# Patient Record
Sex: MALE | Race: WHITE | Employment: FULL TIME | URBAN - METROPOLITAN AREA
[De-identification: names, ages, dates, MRNs, and addresses within clinical notes are randomized per-mention and may not be internally consistent; named-entity substitution may affect disease eponyms.]

---

## 2019-04-08 ENCOUNTER — RECORDS - HEALTHEAST (OUTPATIENT)
Dept: LAB | Facility: CLINIC | Age: 39
End: 2019-04-08

## 2019-04-08 LAB
BASOPHILS # BLD AUTO: 0 THOU/UL (ref 0–0.2)
BASOPHILS NFR BLD AUTO: 1 % (ref 0–2)
EOSINOPHIL # BLD AUTO: 0.2 THOU/UL (ref 0–0.4)
EOSINOPHIL NFR BLD AUTO: 2 % (ref 0–6)
ERYTHROCYTE [DISTWIDTH] IN BLOOD BY AUTOMATED COUNT: 12.1 % (ref 11–14.5)
HCT VFR BLD AUTO: 49.6 % (ref 40–54)
HGB BLD-MCNC: 17.1 G/DL (ref 14–18)
LYMPHOCYTES # BLD AUTO: 2.1 THOU/UL (ref 0.8–4.4)
LYMPHOCYTES NFR BLD AUTO: 34 % (ref 20–40)
MCH RBC QN AUTO: 31.4 PG (ref 27–34)
MCHC RBC AUTO-ENTMCNC: 34.5 G/DL (ref 32–36)
MCV RBC AUTO: 91 FL (ref 80–100)
MONOCYTES # BLD AUTO: 0.5 THOU/UL (ref 0–0.9)
MONOCYTES NFR BLD AUTO: 7 % (ref 2–10)
NEUTROPHILS # BLD AUTO: 3.5 THOU/UL (ref 2–7.7)
NEUTROPHILS NFR BLD AUTO: 56 % (ref 50–70)
PLATELET # BLD AUTO: 259 THOU/UL (ref 140–440)
PMV BLD AUTO: 10.8 FL (ref 8.5–12.5)
RBC # BLD AUTO: 5.44 MILL/UL (ref 4.4–6.2)
WBC: 6.3 THOU/UL (ref 4–11)

## 2021-06-18 ENCOUNTER — TELEPHONE (OUTPATIENT)
Dept: PSYCHIATRY | Facility: CLINIC | Age: 41
End: 2021-06-18

## 2021-06-18 NOTE — TELEPHONE ENCOUNTER
"PSYCHIATRY CLINIC PHONE INTAKE     SERVICES REQUESTED / INTERESTED IN          Med Management Rapid Access per Tony    Presenting Problem and Brief History                              What would you like to be seen for? (brief description):    Pt referred to and ok'd by Miguel A  Pt reports struggling with anxiety for \"years,\" has tried some different meds, nothing really helps, it's gotten a lot worse over the last three years. Mild agorophobia, panic attacks/anxiety episodes. Reports losing interest in a lot things, focusing. Pt wonders about ADHD. Obtrusive thoughts.  Physical ailments - stomach problems, diverticulitis, lower stomach pain, been seeing GI specialists for the last couple years trying to figure it out    Have you received a mental health diagnosis? Yes   Which one (s): anxiety, mildly agorophobia  Is there any history of developmental delay?  No   Are you currently seeing a mental health provider?  No            Who / month last seen:    Do you have mental health records elsewhere?  Yes [facility], also a counselor for a couple months (david holguin?)  Will you sign a release so we can obtain them?  Yes    Have you ever been hospitalized for psychiatric reasons?  No  Describe:  na    Do you have current thoughts of self-harm?  No    Do you currently have thoughts of harming others?  No       Substance Use History     Do you have any history of alcohol / illicit drug use?  Yes  Describe:  Alcohol use  Have you ever received treatment for this?  No    Describe:  na     Social History     Who is the patient's a guardian?  self    Name / number: self  Have you had an ACT team in last 12 months?  No  Describe: na   Do you have any current or past legal issues?  No  Describe: na   OK to leave a detailed voicemail?  Yes    Medical/ Surgical History                                 There is no problem list on file for this patient.         Medications             No current " outpatient medications on file.         DISPOSITION      Phone screen completed with pt and routed to Sarah Rivera for Rapid Access wait list       Mariel Gutierrez

## 2021-10-04 ENCOUNTER — VIRTUAL VISIT (OUTPATIENT)
Dept: PSYCHIATRY | Facility: CLINIC | Age: 41
End: 2021-10-04
Attending: SOCIAL WORKER
Payer: COMMERCIAL

## 2021-10-04 DIAGNOSIS — F33.1 MAJOR DEPRESSIVE DISORDER, RECURRENT EPISODE, MODERATE (H): ICD-10-CM

## 2021-10-04 DIAGNOSIS — F41.1 GENERALIZED ANXIETY DISORDER: Primary | ICD-10-CM

## 2021-10-04 PROCEDURE — 90791 PSYCH DIAGNOSTIC EVALUATION: CPT | Mod: GT | Performed by: SOCIAL WORKER

## 2021-10-04 NOTE — Clinical Note
Ayo Eller,    You see Augusto next Wednesday as part of GET. Here's the start of his DA. Anxiety and depression, has increased significantly in past several years. Anxiety leads to irritability, to point that he has punched walls/hurt hand. Also has chronic stomach pain. Hopefully we can talk more in RABT on Monday.    Jennifer

## 2021-10-04 NOTE — PROGRESS NOTES
"VIDEO VISIT  Augusto Lea is a 41 year old patient who is being evaluated via a billable video visit.      The patient has been notified of following:   \"We have found that certain health care needs can be provided without the need for an in-person physical exam. This service lets us provide the care you need with a video conversation. If a prescription is necessary we can send it directly to your pharmacy. If lab work is needed we can place an order for that and you can then stop by our lab to have the test done at a later time. Insurers are generally covering virtual visits as they would in-office visits so billing should not be different than normal.  If for some reason you do get billed incorrectly, you should contact the billing office to correct it and that number is in the AVS .    Patient has given verbal consent for video visit?: Yes   How would you like to obtain your AVS?: N/A  AVS SmartPhrase [PsychAVS] has been placed in 'Patient Instructions': N/A      Video- Visit Details  Type of service:  video visit for diagnostic assessment  Time of service:    Date:  10/04/2021     Video Start Time:  2:02 pm        Video End Time:  3:44 pm    Reason for video visit:  Patient unable to travel due to Covid-19  Originating Site (patient location):  [facility]   Location- Patient's home  Distant Site (provider location):  [facility]  Mode of Communication:  Video Conference via [employer]  Consent:  Patient has given verbal consent for video visit?: Yes       ----------------------------------------------------------------------------------------------------------  [facility] [place], [place]   Psychiatry Clinic Diagnostic Assessment  Rapid Access Brief Treatment [RABT]                      Date: Oct 4, 2021    Duration: 2:02 pm to 3:44 pm (102 minutes)    Augusto Lea is a 41 year old male who prefers the name Augusto and pronoun he, him, his.    PCP: No Ref-Primary, " "Physician  Other Providers: None  Referred by self for evaluation of anxiety and questions about ADHD.       History was provided by patient who was a good historian, as well as chart review. Limits of confidentiality and purpose of the session (diagnostic evaluation) were described at the beginning of the session. An overview of the Rapid Access Brief Treatment program was provided, including that this is short-term treatment program and that recommendations for longer-term care will be provided if needed.     Chief Complaint                                                                                                           Patient is seeking evaluation and recommendations for anxiety and depression symptoms.     MENTAL HEALTH HISTORY AND DIAGNOSTIC INTERVIEW                                                                                   Augusto HERNANDEZ Lindseyyuniel completed the [employer] Neuropsychiatric Interview to aid in diagnostic assessment of current psychological functioning.    DEPRESSION: Patient reports symptoms for the past 2 years, including feeling down, depressed, and hopeless, amotivation, anhedonia (described it as a \"constant state of boredom\"), insomnia, fatigue, difficulty concentrating/making decisions, psychomotor agitation, and intermittent decreased appetite. He states he feels \"disappointed in myself but not worthless.\"    PHQ-9: 19, indicting moderately severe depression    SUICIDALITY: -intrusive thoughts with plan, without intent. Rates the likeliness of suicide in the next 3 months as a 1/100, because \"I don't like to rate anything as a 0 or 100.\"    SIB: Patient reports self-injurious behaviors of punching walls about 4x per year, which he attributes to his irritability and anxiety. He reports subsequently breaking his wrist and harming his hands as a result of this behavior. Denies other SIB.    MANIC & HYPOMANIC EPISODES: Patient reports persistent irritability (which he " "associates with anxiety). He denies racing thoughts, feeling full of energy, recklessness, and needing less sleep.     PANIC DISORDER: Patient reports having a handful of panic attacks per year. He describes symptoms of a skipping/racing heart, sweaty hands, hot flushes, trembling/shaking, shortness of breath, choking sensation/lump in throat, chest pain/pressure, fear of losing control, fear of dying. Denies dizziness/lightheadedness and tingling/numbness, dissociation, and fear of having another panic attack.    AGORAPHOBIA: Patient reports anxiety of leaving the house/being away from home for \"too long.\" He states these situations do not always bring on anxiety, and he does not always need a  to face them. Does not meet criteria for diagnosis.    SOCIAL ANXIETY DISORDER: Patient reports persistent fear and significant anxiety of being watched/judged while walking down the street and in public that has improved over the last couple years but is still present. He also endorses intermittent fears of dating, public speaking, and eating in front of others that impact his ability to function socially. He denies difficulty initiating/making a conversation, participating in small groups, and speaking to authority figures.    OBSESSIVE-COMPULSIVE DISORDER: Patient reports intrusive thoughts of death, dying, and \"impending doom\" such as someone breaking into his house. He reports compulsive behaviors such as double checking locks about 2x, counting stairs, and being obsessed with even numbers. He reports he does not feel like the compulsions take up too much of his time, but the intrusive thoughts interfere with his social life and ability to concentrate. Likely does not meet full criteria for diagnosis.    POST-TRAUMATIC STRESS DISORDER: Patient does not endorse history of trauma.    ALCOHOL USE DISORDER: Patient reports drinking about 1.75 mL of vodka per week. He prefers [place] and does not like vodka as much, " "but he switched to vodka to try to help his chronic abdominal pain. He states he does not drink everyday and \"tries to not drink during the week.\" Patient reports drinking alcohol helps with his anxiety, but not his depression. Patient reports sometimes drinking more than he planned, intermittently craving alcohol, and has attempted to decrease the amount he drinks with no success. He notes he received a DUI about 10 years ago but has not driven while drinking since then or engaged in other risky behaviors when drinking.    SUBSTANCE USE DISORDER: Patient reports recently trying marijuana edibles in an effort to decrease anxiety, but he reports this did not help. Does not endorse any other substance use.    EATING DISORDER: Patient does not endorse eating disorder symptoms.     GENERALIZED ANXIETY DISORDER: Patient reports restlessness/feeling on edge, muscle tension, difficulty controlling worries, difficulty sleeping, difficulty concentrating, feeling tired, and irritability that worsens with chronic pain flare-ups.     JE score today: 19, indicating severe anxiety    PSYCHOSIS: Patient does not endorse psychosis symptoms.    ADHD: Patient reports difficulty concentrating, inattention, easily distracted since childhood, ongoing difficulty starting/completing tasks, lack of motivation. He reports he is \"messy, but it works for me.\"      Substance Use History                                                                 Past Use- Alcohol- yes  and Cannabis- recently tried cannabis 1x for anxiety management with no relief   Treatment [#, most recent]- None    Medical Consequences [withdrawal, sz etc]- SIB- None   Consequences- SIB- Patient reports he received a DUI about 10 years ago, but he has not driven after drinking since and denies current risky behavior while drinking. Alcohol may negatively impact stomach pain.    CAGE: Patient answered \"yes\" to feeling like he should Cut down on his drinking and feeling " "Guilty about his drinking. Score of 1 would indicate potential need for further assessment.     Patient appears in Contemplation Stage of Change. He expressed some concern about level of drinking. He tries not to drink during the week but has not yet explored additional strategies/support in changing alcohol use.    Psychiatric History     Suicidal ideation- Yes, patient describes SI as intrusive thoughts that he \"wouldn't act on because I'm scared to die.\" Thoughts include using gun, subside on their own his intrusive thoughts. He has access to hunting guns at home and wants to have a handgun for protection, but does not trust self due to his intrusive thoughts. Protective factor of being scared to die.  Suicide Attempt:   #- 0     SIB- Patient reports punching walls about 3-4 times per year that he associates with his irritability and anxiety.   Marcella- None    Psychosis- None    Psych Hosp- None   Outpatient Programs [ DBT, Day Treatment, Eating Disorder Tx etc]- None, client reports trying therapy in the past  PAST MED TRIALS: Lexapro, but he \"did not feel like it was doing enough.\" He also reports having tried Paxil, but this \"gave me every side effect, including intrusive thoughts.\" Alprazolam    WHODAS: 36      Patient report of symptoms:   Depression-  Patient reports symptoms for the past 2 years of feeling down, depressed, and hopeless, as well as lack of motivation, anhedonia (described it as a \"constant state of boredom\"), insomnia, fatigue, difficulty concentrating/making decisions, fidgety/restlessness, and intermittent decreased appetite. He used to have many hobbies including hunting, carpentry, golfing, and playing video games, but \"none of it brings me pleasure anymore.\" He states that \"I don't remember what happy is, content is the best I can get to.\" He reports being a  2+ years ago, which was the last memory he has of being \"happy.\"  Anxiety- Patient reports experiencing \"test anxiety\" " "as a child, but his anxiety notably worsened about 3-4 years ago. He states he does not \"do the things I used to do, sometimes I can't do anything.\" He notes he takes approximately 1-2 mental health days per month, and received reasonable accommodation to work from home due to his anxiety. He also endorses insomnia, racing thoughts, fidgeting/pacing/restlessness, and difficulty concentrating.  OCD- Patient reports intrusive thoughts that began about 10-12 years ago of death, dying, and \"impending doom\" such as someone breaking into his house. He reports he intermittently tries to suppress these thoughts, particularly at bedtime. He reports compulsive behaviors such as double checking locks about 2x, counting stairs, and being obsessed with even numbers. He reports he does not feel like the compulsions take up too much of his time, but the intrusive thoughts interfere with his social life and ability to concentrate. He states he has been prescribed medication to manage his intrusive thoughts, but has had no relief.    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 He endorses negative body image and reports that he \"used to be 240 pounds of muscle, but this kind of turned into 240 pounds of fat since I stopped working out.\"     Social/ Family History                    FINANCIAL SUPPORT- Patient reports working as a  for the [employer] for about 10 years. He currently works from home after receiving reasonable accommodation due to his anxiety. He would like more challenges in his work.  CHILDREN- None       LIVING SITUATION- Currently living alone. He reports moving here from [place], [place] for work, but he states he does not like [place]. \"If it were up to me, I would keep my job but move back to [place],\" but reports this is not " "possible.  LEGAL- DUI about 10 years ago. Does not endorse other legal concerns.  EARLY HISTORY/ EDUCATION- Patient is not aware of any developmental barriers as a child. Patient does not endorse any educational barriers, reports having a Bachelor's degree in criminal justice.  SOCIAL/ SPIRITUAL SUPPORT- Patient reports he has close relationships with his mother, father, 3 sisters, nieces, and a nephew. He also reports having a couple friends in [place], but his closest friends live in his hometown of [place], [place]. He states he has not seen these friends for about 2 years due to COVID-19 and living far away, which is difficult for him and creates feelings of loneliness. He reports he avoids romantic relationships (has not had one for 4 years) because he \"feels like a burden\" and \"meeting someone seems terrifying.\" Patient reports growing up Mandaeism and going to [employer], but now he reports \"not having much spirituality\" or [employer] affiliation.  CULTURAL INFLUENCES/ IMPACT- Patient reports identifying as male, he states \"ethnicity is not important to me.\" He describes himself as a \"country, small town person\" and [place], [place] was the \"perfect sized town\" for him.       TRAUMA HISTORY (self-report)- None  FEELS SAFE AT HOME- Yes  FAMILY HISTORY-  Patient states he is unaware of mental health family history, his father drank alcohol when patient was a child but his father no longer drinks alcohol. 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  MEDICAL: Chronic low abdomen pain for the past 3-4 years, which worsens anxiety and depression symptoms. He states he has been seeing doctors for this chronic pain and they have ruled out cancer.    Current Medications                                                                                           "               No current outpatient medications on file.       Mental Status Exam                                                                                       Alertness: alert  and oriented  Appearance: adequately groomed  Behavior/Demeanor: cooperative, with good  eye contact   Speech: regular rate and rhythm  Language: good  Psychomotor: normal or unremarkable  Mood: anxious  Affect: appropriate; was congruent to mood; was congruent to content  Thought Process/Associations: unremarkable  Thought Content:  Reports none;  Denies suicidal ideation, although patient reports recurrent intrusive thoughts of dying and harming self with a gun, no intent.  Perception:  Reports none;  Denies none  Insight: good  Judgment: fair  Cognition: (6) does  appear grossly intact; formal cognitive testing was not done  Gait and Station: Unable to assess due to video visit    DSM-5 DIAGNOSES     F41.1 Generalized Anxiety Disorder  F33.1 Major Depressive, Recurrent, Moderate  -Rule out ADHD, Social Anxiety Disorder    Augusto Lea is a 41 year old year old  single White male who presents to clinic for assessment and treatment of anxiety and depression symptoms. He reported some anxiety related to test taking as a child. Current symptoms of anxiety started approximately 3-4 years ago, with depressive symptoms following. Current anxiety symptoms include periods of panic, racing thoughts, difficulty with concentration, insomnia, intrusive thoughts (sense of doom), and irritability. Current symptoms of depression include anhedonia, amotivation, feeling down, and suicidal ideation. Based upon patient report, [facility] assessment, and review of records, diagnoses of Generalized Anxiety Disorder and Major Depressive Disorder, Recurrent, moderate were given. Although patient seeking support to manage symptoms anxiety and these appear most prevalent, it appears that depressive symptoms are playing an increasingly significant role in  patient's functioning. Differential diagnoses or rule outs of ADHD and Social Anxiety Disorder were also explored. Some of the symptoms identified as potential ADHD may be explained by current anxiety or depressive symptoms (ie feeling fidgety, difficulty concentrating), but will continue to be explored. As Augusto identifies a range of anxiety-provoking situations, JE may better describe range of symptoms.  There are medical comorbidities which impact this treatment, including chronic stomach pain.     Current symptoms represent a change in functioning in the following areas: home-chores, work, social-strangers and social-friends. Identified psychosocial factors that could potentially interact with symptoms and functioning include Medical Comorbidites. More specifically, Augusto identifies periods of being unable to work several times each month and has been approved for accommodations at work, including working from home. He reports limiting social activities due to anxiety and depression symptoms and is having difficulty completing tasks around his home. He has also reported punching walls and hurting his hand due to irritability and anxiety. Of note, patient reports consuming a significant amount of alcohol each week. It is recommended that he find ways to decrease substance use. Writer able to provide community resources if requested.    Augusto Lea also exhibited or shared many strengths during the assessment, including strong work history, family and friend support, a history of many interests and hobbies, and seeking out professional supports. He self-identified strengths of intelligence honesty/authenticity. Strengths in these areas may assist recovery and decrease functional impairments by providing on-going stability, decreasing impact of possible social determinants of health, and providing structure to non-working time when he begins to experience relief from symptoms.    Augusto is not currently engaged  in services in the community; this is not a recommended need at this time.    Writer is making the following recommendations:  -see Dr. Grady in 1 week for med eval  -psychotherapy, potentially cognitive behavioral therapy for anxious and depressed thoughts, supportive to explore future goals/plans and how these may relate to anxious state. Without further treatment, prognosis for patient is fair. It is expected that Augusto will benefit from a combination of medication management and individual psychotherapy interventions.      Plan                                                                                                                      The results of this diagnostic assessment and psychological testing will be discussed at the next RABT team meeting to determine appropriateness for RABT program and treatment recommendations.     Augusto Lea will return in 1 week for a medical evaluation and to discuss feedback from the team and treatment recommendations.        RTC: 1 week for medical evaluation and feedback from treatment team.    CRISIS NUMBERS:   Provided routinely in AVS.    Treatment Risk Statement:  The patient understands the risks, benefits and alternatives. Agrees to treatment with the capacity to do so and agrees to call clinic for any problems. The patient understands to call 911 or go to the nearest ED if life threatening or urgent symptoms occur.     PROVIDER:  Rose Marie Enriquez, KRISHNA, LICSW

## 2021-10-10 ENCOUNTER — HEALTH MAINTENANCE LETTER (OUTPATIENT)
Age: 41
End: 2021-10-10

## 2021-10-11 NOTE — PROGRESS NOTES
Telehealth Details  Type of service:  video visit for consult  Date of service: Oct 13, 2021  Time of service:    Start Time:  1:43 PM    End Time:  3:22 PM    Reason for video visit:  Services only offered telehealth  Originating Site (patient location):  Patient's home  Distant Site (provider location):  [facility]  Mode of Communication:  Video conference via Nanosphere       [facility]  Rapid Access Brief Treatment Program  MEDICATION MANAGEMENT CONSULTATION     Augusto Lea is a 41 year old with history of anxiety.     CARE TEAM: PCP- Sonia Kennedy, Therapist- None     Chief Complaint     Anxiety and depression have been really difficult lately.      Assessment & Plan    History and interview support the following diagnoses:   Major depressive disorder, recurrent, severe  Generalized anxiety disorder  Alcohol dependence, moderate  Insomnia secondary to mood disorder    Augusto reports long history of anxiety that has worsened over time, without obvious contributors, and especially has worsened during the pandemic. Depression is a newer issue, having started more gradually over the last few years, and is closely associated with the anxiety. Also notes a pattern of finding alcohol more helpful than he would prefer in helping to reduce anxiety and insomnia. Actively tries to limit use, but does tend to rely on it when things get more severe.   Psychotherapy discussion: Primary recommendation for the treatment of mood symptoms, especially anxiety. Supportive therapy can be useful for reducing the impact of acute or chronic psychosocial stressors. CBT can be particularly helpful for ruminative thinking and addressing maladaptive coping mechanisms that may worsen anxiety.   Medication discussion:   Primary mood support medications:   Augusto has not been on a lot in the past in terms of primary mood support meds (antidepressants). He did have severe side effects with Paxil. At this time I  recommended trying him on a moderate speed titration of sertraline as an option that may provide some benefits for him.   Short acting anxiolytic medications:   Augusto does depend frequently on short acting anxiolytic substances (alcohol and alprazolam) to reduce anxiety and insomnia symptoms. We discussed in detail the complex interaction between anxiety and substances, and how short acting substances do perpetuate anxiety and usually worsen it over time, prevent it from improving.   I am okay with continuing the alprazolam for the moment to minimize simultaneous medication changes, but Augusto is aware that it is the most potent short acting anxiolytic that exists, and that it is therefore also the most reinforcing to anxiety.   Given the issues with falling asleep and getting up in the morning, there may be some delayed phase circadian rhythm shift. Melatonin can be useful for this when used appropriately (lower doses, taken 3-4 hours prior to intended bedtime), but would not treat other concurrent issues that may also be interfering with sleep. Light box therapy can also be helpful for circadian shifts.   Antiadrenergic medications:   Augusto has evidence of autonomic hyperarousal (elevated fight-or-flight), with high baseline anxiety including physical symptoms and occasional panic attacks. He has tried low dose propranolol and did not find it to be helpful. While a higher dose of propranolol could potentially have some benefit, clonidine seems like a better option at this time, as it could help with nighttime symptoms as well.   Alcohol dependence:   Augusto does note that he relies on alcohol more often than he would like to, and did like the idea of naltrexone and how it can reduce the drive to drink and diminish the strength of the euphoria generated by drinking. We will discuss this more at next appointment.     [place] PRESCRIPTION MONITORING PROGRAM [] was checked today: indicates taking controlled substances as  prescribed    PSYCHOTROPIC DRUG INTERACTIONS: None   MANAGEMENT:  N/A     Plan     1) PSYCHOTROPIC MEDICATION RECOMMENDATIONS:  - Start sertraline 25mg daily for 1 week, then if tolerating, increase to 50mg daily  - Discontinue propranolol  - Start clonidine 0.1mg at bedtime   - May take alprazolam 0.5mg at bedtime as needed for anxiety    [see the following SmartPhrase(s) for more information: PSYMEDINFOSERTRALINE - PSYMEDINFOBENZOS]    2) THERAPY: Psychotherapy is a primary recommendation.   I did recommend day treatment or MHCD treatment at this time based on severity of symptoms, but Augusto does not feel like he would be able to handle this at this time.   He is willing to engage in individual therapy currently, will inquire about this through the program.     3) NEXT DUE:   Labs- Routine monitoring is not indicated for current psychotropic medication regimen   ECG- Routine monitoring is not indicated for current psychotropic medication regimen   Rating Scales- N/A    4) REFERRALS / COORDINATION: None    5) DISPOSITION:   - Pt would benefit from brief psychiatric intervention (up to ~5 visits) to adjust meds and gauge for benefit.   - Follow up with Dionisio Grady MD in 4 weeks. Plan to transition med management back to designated prescriber after brief care is complete.     Treatment Risk Statement:  The patient understands the risks, benefits, adverse effects and alternatives. Agrees to treatment with the capacity to do so. No medical contraindications to treatment. Agrees to contact provider for any problems. The patient understands to call 911 or go to the nearest ED if urgent or life threatening symptoms occur. Crisis contact numbers are provided routinely in the After Visit Summary.       PROVIDER:  Dionisio Grady MD       Pertinent Background   Per recent DA on 10/4/21:   Depression-  Patient reports symptoms for the past 2 years of feeling down, depressed, and hopeless, as well as lack of motivation,  "anhedonia (described it as a \"constant state of boredom\"), insomnia, fatigue, difficulty concentrating/making decisions, fidgety/restlessness, and intermittent decreased appetite. He used to have many hobbies including hunting, carpentry, golfing, and playing video games, but \"none of it brings me pleasure anymore.\" He states that \"I don't remember what happy is, content is the best I can get to.\" He reports being a  2+ years ago, which was the last memory he has of being \"happy.\"  Anxiety- Patient reports experiencing \"test anxiety\" as a child, but his anxiety notably worsened about 3-4 years ago. He states he does not \"do the things I used to do, sometimes I can't do anything.\" He notes he takes approximately 1-2 mental health days per month, and received reasonable accommodation to work from home due to his anxiety. He also endorses insomnia, racing thoughts, fidgeting/pacing/restlessness, and difficulty concentrating.  OCD- Patient reports intrusive thoughts that began about 10-12 years ago of death, dying, and \"impending doom\" such as someone breaking into his house. He reports he intermittently tries to suppress these thoughts, particularly at bedtime. He reports compulsive behaviors such as double checking locks about 2x, counting stairs, and being obsessed with even numbers. He reports he does not feel like the compulsions take up too much of his time, but the intrusive thoughts interfere with his social life and ability to concentrate. He states he has been prescribed medication to manage his intrusive thoughts, but has had no relief.     History of Present Illness    Overall Augusto has not really been able to figure out the trigger for things getting worse over time. He used to enjoy golfing and doing other things, but just doesn't feel that most of the time now. Has a few good days, but they are rare.   Overall depression may be within the last 5 years or so.   Has episodes where anxiety peaks ~few " times per week, where he has to pace and calm himself down. The severe panic attacks happen a few times a year, where he has severe symptoms and it feels like he might die. Things have been worse since COVID. Gets significant issues with irritability. Has punched the wall at times, did hurt / possibly break his hand at one point.   Sleep is very poor. Stays up until 12:30-1 every night because he can't sleep unless he is totally exhausted. Then it is hard to get up in the mornings as well.   Social anxiety is a newer issue that did not seem to be as present before. [place]. 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   Has some intrusive SI thoughts.   Does drink to help mitigate symptoms. Tries not to drink every day. Has at one point gone through 1.75L vodka in 1 week. He is ambivalent about substance use. Does note it may not be ideal, but is not sure about whether he is ready to make changes for this. Alcohol does help with anxiety and relaxation. Helps him to enjoy things more.   Prefers video games to TV, enjoys being more engaged, enjoys the social element, and alcohol does help to enjoy things more.   Notes that primary gave a limited supply which causes anxiety too.   Wouldn't mind checking more for ADHD.   Was able to get an accommodation for work due to the problems caused by the commute.     Recent Symptoms: See Psych ROS / MINI Diagnostic Interview on 10/4/2021.     Pertinent Social Hx:  FINANCIAL SUPPORT- Working as a  for the [employer] for about 10 years. He currently works from home after receiving reasonable accommodation due to his anxiety.   LIVING SITUATION / RELATIONSHIPS- Currently living alone, moved into his house about a year ago. He reports moving here from [place], [place] about 10 years ago for work, it has been a difficult transition.   SOCIAL/  SPIRITUAL SUPPORT- Has family in [place]. Hasn't made a lot of connections in [place].     Pertinent Substance Use  Alcohol- Goes through about 1.75L bottle of Sulligent in about a week.   Nicotine- Used to chew for 10 years, gave up in 2017, but it is hard, helped with anxiety and still craves at times.   Caffeine- 1 cup of coffee some days, mostly none though.   Opioids- None  Narcan Kit- N/A  THC/CBD- Has tried edibles to see about helping with anxiety, but didn't really do much.   Other Illicit Drugs- none     Medical Review of Systems    Dizziness/orthostasis- None  Headaches- None  GI- Chronic low left abdomen pain for the past 3-4 years, which worsens anxiety and depression symptoms. He states he has been seeing doctors for this chronic pain and they have ruled out cancer.  Sexual health- None  A comprehensive review of systems was performed and is negative other than noted in the HPI.     Past Psychiatric History    Self injurious behavior [method, most recent]- None  Suicide attempt [#, most recent, method]- None  Suicidal ideation [passive, active]- Endorses passive suicidal thoughts, especially on days when pain were bad. He is afraid of dying and doesn't think that he would ever act on these thoughts. Has never had any specific plans or intent. He has a hunting rifle in his house but has never had thoughts about using this, doesn't think it would even be possible with this tool. In general the SI is intrusive and only in the background.     Psychosis hx- None  Psych hosp [ #, most recent, committed]- None  Trauma history (self-report)- None  Outpatient programs [Day treatment, DBT, eating disorder tx, etc]- None     Psychotropic Medication Trials      Medication Max Dose (mg) Dates / Duration Helpful? DC Reason / Adverse Effects?   Fluoxetine? 20? 2011     Lexapro  ~2008-9, 2017  Possible lower energy   Paxil    Severe nausea and severe anxiety   alprazolam 0.5  yes    Propranolol  20  no    melatonin   no  "Was groggy, but restless sleep      Social History                [per patient report]   LEGAL- DUI about 10 years ago. Does not endorse other legal concerns.  EARLY HISTORY/ EDUCATION- Patient is not aware of any developmental barriers as a child. Patient does not endorse any educational barriers, reports having a Bachelor's degree in criminal justice.  SOCIAL/ SPIRITUAL SUPPORT- Patient reports he has close relationships with his mother, father, 3 sisters, nieces, and a nephew. He also reports having a couple friends in [place], but his closest friends live in his hometown of [place], [place]. He states he has not seen these friends for about 2 years due to COVID-19 and living far away, which is difficult for him and creates feelings of loneliness. He reports he avoids romantic relationships (has not had one for 4 years) because he \"feels like a burden\" and \"meeting someone seems terrifying.\" Patient reports growing up Mormonism and going to Oriental orthodox, but now he reports \"not having much spirituality\" or Zoroastrian affiliation.  CULTURAL INFLUENCES/ IMPACT- Patient reports identifying as male, he states \"ethnicity is not important to me.\" He describes himself as a \"country, small town person\" and [place], [place] was the \"perfect sized town\" for him.       TRAUMA HISTORY (self-report)- None  FEELS SAFE AT HOME- Yes     Family History    Patient states he is unaware of mental health family history, his father drank alcohol when patient was a child but his father no longer drinks alcohol. 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     Past Medical History      Neurologic Hx [head injury, seizures, etc.]: None  No past medical history on file.     Medications      Current Outpatient Medications   Medication Sig Dispense Refill     ALPRAZolam (XANAX) 0.25 " MG tablet TAKE 1 TABLET BY MOUTH AT BEDTIME AS NEEDED        Physical Exam  (Vitals Only)   There were no vitals taken for this visit.    Pulse Readings from Last 5 Encounters:   No data found for Pulse     Wt Readings from Last 5 Encounters:   No data found for Wt     BP Readings from Last 5 Encounters:   No data found for BP      Mental Status Exam   Alertness: alert  and oriented  Appearance: adequately groomed  Behavior/Demeanor: cooperative, pleasant and calm, with good eye contact   Speech: normal and regular rate and rhythm  Language: intact and no problems  Psychomotor: normal or unremarkable  Mood: depressed and anxious  Affect: full range and appropriate; was congruent to mood  Thought Process/Associations: unremarkable  Thought Content:  Reports passive SI without intent or plan;  Denies violent ideation and delusions  Perception:  Reports none;  Denies auditory hallucinations and visual hallucinations  Insight: intact  Judgment: intact  Cognition: does  appear grossly intact; formal cognitive testing was not done  Gait and Station: not observed     Data      No flowsheet data found.  PHQ-9 SCORE 10/4/2021   PHQ-9 Total Score MyChart 19 (Moderately severe depression)   PHQ-9 Total Score 19     JE-7 SCORE 10/4/2021   Total Score 19 (severe anxiety)   Total Score 19

## 2021-10-13 ENCOUNTER — VIRTUAL VISIT (OUTPATIENT)
Dept: PSYCHIATRY | Facility: CLINIC | Age: 41
End: 2021-10-13
Attending: PSYCHIATRY & NEUROLOGY
Payer: COMMERCIAL

## 2021-10-13 ENCOUNTER — TELEPHONE (OUTPATIENT)
Dept: PSYCHIATRY | Facility: CLINIC | Age: 41
End: 2021-10-13

## 2021-10-13 DIAGNOSIS — F10.20 UNCOMPLICATED ALCOHOL DEPENDENCE (H): ICD-10-CM

## 2021-10-13 DIAGNOSIS — F33.2 SEVERE EPISODE OF RECURRENT MAJOR DEPRESSIVE DISORDER, WITHOUT PSYCHOTIC FEATURES (H): Primary | ICD-10-CM

## 2021-10-13 DIAGNOSIS — F51.05 INSOMNIA DUE TO OTHER MENTAL DISORDER: ICD-10-CM

## 2021-10-13 DIAGNOSIS — F99 INSOMNIA DUE TO OTHER MENTAL DISORDER: ICD-10-CM

## 2021-10-13 DIAGNOSIS — F41.1 GENERALIZED ANXIETY DISORDER: ICD-10-CM

## 2021-10-13 PROCEDURE — 90792 PSYCH DIAG EVAL W/MED SRVCS: CPT | Mod: GT | Performed by: PSYCHIATRY & NEUROLOGY

## 2021-10-13 RX ORDER — ALPRAZOLAM 0.5 MG
0.5 TABLET ORAL
COMMUNITY
Start: 2021-08-16 | End: 2021-10-13

## 2021-10-13 RX ORDER — PROPRANOLOL HYDROCHLORIDE 20 MG/1
20 TABLET ORAL 3 TIMES DAILY
COMMUNITY
Start: 2021-08-16 | End: 2021-10-13 | Stop reason: ALTCHOICE

## 2021-10-13 RX ORDER — ALPRAZOLAM 0.5 MG
0.5 TABLET ORAL
Qty: 15 TABLET | Refills: 0 | Status: SHIPPED | OUTPATIENT
Start: 2021-10-13 | End: 2021-11-08

## 2021-10-13 RX ORDER — CLONIDINE HYDROCHLORIDE 0.1 MG/1
0.1 TABLET ORAL AT BEDTIME
Qty: 30 TABLET | Refills: 0 | Status: SHIPPED | OUTPATIENT
Start: 2021-10-13 | End: 2021-11-08

## 2021-10-13 RX ORDER — ALPRAZOLAM 0.25 MG
TABLET ORAL
COMMUNITY
Start: 2021-07-20 | End: 2021-10-13

## 2021-10-13 ASSESSMENT — PAIN SCALES - GENERAL: PAINLEVEL: NO PAIN (0)

## 2021-10-13 NOTE — PATIENT INSTRUCTIONS
Long acting / mood support medication:   Try sertraline, starting at low dose of 25mg for 1 week, then if tolerating, increase to 50mg.     Short acting:   May take alprazolam 0.5mg as needed for anxiety.   Goal will be to transition to a less potent medication at next appointment.     Anti-adrenergic / adrenaline reducing:   Discontinue propranolol  Start clonidine 0.1mg at bedtime. May start with 0.05mg (half tablet) for a week if the full tablet causes any dizziness.     We can talk next time about naltrexone for reducing the drive to drink.         **For crisis resources, please see the information at the end of this document**     Patient Education      Thank you for coming to the [facility] [place] [facility].    Lab Testing:  If you had lab testing today and your results are reassuring or normal they will be mailed to you or sent through [employer] within 7 days. If the lab tests need quick action we will call you with the results. The phone number we will call with results is # [phone] (home) . If this is not the best number please call our clinic and change the number.    Medication Refills:  If you need any refills please call your pharmacy and they will contact us. Our fax number for refills is [phone]. Please allow three business for refill processing. If you need to  your refill at a new pharmacy, please contact the new pharmacy directly. The new pharmacy will help you get your medications transferred.     Scheduling:  If you have any concerns about today's visit or wish to schedule another appointment please call our office during normal business hours [phone] (8-5:00 M-F)    Contact Us:  Please call [phone] during business hours (8-5:00 M-F).  If after clinic hours, or on the weekend, please call  [phone].    Financial Assistance [phone]  [facility] Billing [phone]  Central Billing Office, [facility]: [phone]  [facility] Billing  [phone]  Medical Records [phone]  [facility] Patient Bill of Rights [email or web address]       MENTAL HEALTH CRISIS NUMBERS:  For a medical emergency please call  911 or go to the nearest ER.     [facility]:   [facility] -[phone]   Crisis Residence [facility] -[phone]   Walk-In Counseling Center [facility] -[phone]   COPE 24/7 [facility] Mobile Team -[phone] (adults)/[phone] (child)  CHILD: [facility] needs assessment team - [phone]      [facility]:   [facility] - [phone]   Walk-in counseling [facility] - [phone]   Walk-in counseling [facility] - [phone]   Crisis Residence [facility] - [phone]  Urgent Care Adult Mental Rgtign-[phone] mobile unit/ 24/7 crisis line    National Crisis Numbers:   National Suicide Prevention Lifeline: 8-315-249-TALK (703-232-3828)  Poison Control Center - 1-426.555.8262  [employer]/resources for a list of additional resources (SOS)  Trans Lifeline a hotline for transgender people 9-833-050-4492  The [employer]rique Project a hotline for LGBT youth 1-969.216.5690  Crisis Text Line: For any crisis 24/7   To: 986717  see www.crisistextline.org  - IF MAKING A CALL FEELS TOO HARD, send a text!         Again thank you for choosing [facility] MENTAL HEALTH & ADDICTION [facility] and please let us know how we can best partner with you to improve you and your family's health.    You may be receiving a survey regarding this appointment. We would love to have your feedback, both positive and negative. The survey is done by an external company, so your answers are anonymous.

## 2021-10-13 NOTE — TELEPHONE ENCOUNTER
On October 13, 2021, at 12:30 PM, writer called patient at [phone] to confirm Virtual Visit. Writer unable to make contact with patient. Writer left detailed voice message for call back. [phone] left as call back number. Janice Malloy, [facility]

## 2021-10-13 NOTE — PROGRESS NOTES
"VIDEO VISIT  Augusto Lea is a 41 year old patient that has consented to receive services via billable video visit.      The patient has been notified of following:   \"This video visit will be conducted via a call between you and your physician/provider. We have found that certain health care needs can be provided without the need for an in-person physical exam. This service lets us provide the care you need with a video conversation. If a prescription is necessary we can send it directly to your pharmacy. If lab work is needed we can place an order for that and you can then stop by our lab to have the test done at a later time. Insurers are generally covering virtual visits as they would in-office visits so billing should not be different than normal.  If for some reason you do get billed incorrectly, you should contact the billing office to correct it and that number is in the AVS .    Video Conference to be completed via:  Edward    If patient attempts to join the video via imgix but is unable to, they would prefer that the provider send them a video invitation via:   Send to e-mail at: [email or web address]      How would patient like to obtain AVS?:  MyChart    "

## 2021-10-13 NOTE — Clinical Note
Augusto Ivy did say no to day treatment, but he would be interested in working with a therapist. Is there a possibility for therapy through our program right now?

## 2021-10-18 ENCOUNTER — TELEPHONE (OUTPATIENT)
Dept: PSYCHIATRY | Facility: CLINIC | Age: 41
End: 2021-10-18

## 2021-10-18 NOTE — CONFIDENTIAL NOTE
SW called patient to discuss next steps for therapy. Augusto appeared to express some hesitance (noted it was not necessarily his idea). Writer provided education on benefits of completing therapy with medication management. Writer offered therapy options with social work learner. Augusto expressed strong hope to not work with a learner at this time, but expressed regret/feeling bad about this decision. Writer reinforced patient choice and offered therapy session with writer. Plan made for virtual session on Monday October 25 at 4:00 pm. Provided brief overview of what first session would involve.    KRISHNA Vicente, [facility]  [phone]

## 2021-10-25 ENCOUNTER — VIRTUAL VISIT (OUTPATIENT)
Dept: PSYCHIATRY | Facility: CLINIC | Age: 41
End: 2021-10-25
Attending: SOCIAL WORKER
Payer: COMMERCIAL

## 2021-10-25 DIAGNOSIS — Z71.89 COUNSELING AND COORDINATION OF CARE: Primary | ICD-10-CM

## 2021-11-01 ENCOUNTER — TELEPHONE (OUTPATIENT)
Dept: PSYCHIATRY | Facility: CLINIC | Age: 41
End: 2021-11-01

## 2021-11-01 ENCOUNTER — VIRTUAL VISIT (OUTPATIENT)
Dept: PSYCHIATRY | Facility: CLINIC | Age: 41
End: 2021-11-01
Attending: SOCIAL WORKER
Payer: COMMERCIAL

## 2021-11-01 DIAGNOSIS — F33.1 MAJOR DEPRESSIVE DISORDER, RECURRENT EPISODE, MODERATE (H): ICD-10-CM

## 2021-11-01 DIAGNOSIS — F41.1 GENERALIZED ANXIETY DISORDER: Primary | ICD-10-CM

## 2021-11-01 PROCEDURE — 90837 PSYTX W PT 60 MINUTES: CPT | Mod: GT | Performed by: SOCIAL WORKER

## 2021-11-01 NOTE — TELEPHONE ENCOUNTER
Augusto had reported to his therapist through our program that the meds were impacting his sleep. He notes that dreams do seem to have been improved, but is still feeling restless with the medication. It is not intolerable, but not ideal. He will plan to stay on the dose for now to see if this improves.

## 2021-11-08 ENCOUNTER — VIRTUAL VISIT (OUTPATIENT)
Dept: PSYCHIATRY | Facility: CLINIC | Age: 41
End: 2021-11-08
Attending: PSYCHIATRY & NEUROLOGY
Payer: COMMERCIAL

## 2021-11-08 ENCOUNTER — TELEPHONE (OUTPATIENT)
Dept: PSYCHIATRY | Facility: CLINIC | Age: 41
End: 2021-11-08

## 2021-11-08 DIAGNOSIS — F99 INSOMNIA DUE TO OTHER MENTAL DISORDER: ICD-10-CM

## 2021-11-08 DIAGNOSIS — F33.2 SEVERE EPISODE OF RECURRENT MAJOR DEPRESSIVE DISORDER, WITHOUT PSYCHOTIC FEATURES (H): Primary | ICD-10-CM

## 2021-11-08 DIAGNOSIS — F51.05 INSOMNIA DUE TO OTHER MENTAL DISORDER: ICD-10-CM

## 2021-11-08 DIAGNOSIS — F41.1 GENERALIZED ANXIETY DISORDER: ICD-10-CM

## 2021-11-08 DIAGNOSIS — F10.20 UNCOMPLICATED ALCOHOL DEPENDENCE (H): ICD-10-CM

## 2021-11-08 PROCEDURE — 99214 OFFICE O/P EST MOD 30 MIN: CPT | Mod: GT | Performed by: PSYCHIATRY & NEUROLOGY

## 2021-11-08 RX ORDER — CLONIDINE HYDROCHLORIDE 0.1 MG/1
0.1 TABLET ORAL AT BEDTIME
Qty: 30 TABLET | Refills: 0 | Status: SHIPPED | OUTPATIENT
Start: 2021-11-08 | End: 2021-12-08

## 2021-11-08 RX ORDER — ALPRAZOLAM 0.5 MG
0.5 TABLET ORAL
Qty: 15 TABLET | Refills: 0 | Status: SHIPPED | OUTPATIENT
Start: 2021-11-08 | End: 2021-12-08

## 2021-11-08 RX ORDER — SERTRALINE HYDROCHLORIDE 100 MG/1
100 TABLET, FILM COATED ORAL DAILY
Qty: 30 TABLET | Refills: 0 | Status: SHIPPED | OUTPATIENT
Start: 2021-11-08 | End: 2021-12-08

## 2021-11-08 RX ORDER — GABAPENTIN 300 MG/1
300-1200 CAPSULE ORAL
Qty: 120 CAPSULE | Refills: 0 | Status: SHIPPED | OUTPATIENT
Start: 2021-11-08 | End: 2021-12-08

## 2021-11-08 ASSESSMENT — PAIN SCALES - GENERAL: PAINLEVEL: NO PAIN (0)

## 2021-11-08 NOTE — PROGRESS NOTES
"VIDEO VISIT  Augusto Lea is a 41 year old patient that has consented to receive services via billable video visit.      The patient has been notified of following:   \"This video visit will be conducted via a call between you and your physician/provider. We have found that certain health care needs can be provided without the need for an in-person physical exam. This service lets us provide the care you need with a video conversation. If a prescription is necessary we can send it directly to your pharmacy. If lab work is needed we can place an order for that and you can then stop by our lab to have the test done at a later time. Insurers are generally covering virtual visits as they would in-office visits so billing should not be different than normal.  If for some reason you do get billed incorrectly, you should contact the billing office to correct it and that number is in the AVS .    Patient will join video visit via:  Beeline (Patient / guardian confirmed to join via Beeline)    If patient attempts to join the video via Beeline at appointment start time, but is unable to, they would prefer that the provider send them a video invitation via:   Send to preferred e-mail: [email or web address]      How would patient like to obtain AVS?:  Mail a copy    Telehealth Details  Type of service:  video visit for consult  Date of service: Nov 8, 2021  Time of service:    Start Time:  10:41 AM    End Time:  11:10 AM    Reason for video visit:  Services only offered telehealth  Originating Site (patient location):  Patient's home  Distant Site (provider location):  [facility] PSY[facility]  Mode of Communication:  Video conference via [facility]  Rapid Access Brief Treatment Program  MEDICAL PROGRESS NOTE #2     Augusto Lea is a 41 year old with history of anxiety.     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 Depression is a newer issue, having started more gradually over the last few years, and is closely associated with the anxiety. Also notes a pattern of finding alcohol more helpful than he would prefer in helping to reduce anxiety and insomnia. Actively tries to limit use, but does tend to rely on it when things get more severe.   Psychotherapy discussion: Primary recommendation for the treatment of mood symptoms, especially anxiety. Supportive therapy can be useful for reducing the impact of acute or chronic psychosocial stressors. CBT can be particularly helpful for ruminative thinking and addressing maladaptive coping mechanisms that may worsen anxiety.   Medication discussion:   Primary mood support medications:   Augusto hasn't noticed any major improvements since starting sertraline, but it has only been 4 weeks, and he is only at 50mg. He did have some strange dreams at first, but this does seem to have resolved. On the plus side, he did note possibly having some slightly improved motivation and less brain fog, so this may be a good sign. Will recommend dose increase at this time.   Short acting anxiolytic medications:   Augusto does depend frequently on short acting anxiolytic substances (alcohol and alprazolam) to reduce anxiety and insomnia symptoms. We discussed in detail the complex interaction between anxiety and substances, and how short acting substances do perpetuate anxiety and usually worsen it over time, prevent it from improving.   I am okay with continuing the alprazolam for the moment to minimize simultaneous medication changes, but Augusto is aware that it is the most potent short acting anxiolytic that  exists, and that it is therefore also the most reinforcing to anxiety.   We did also talk about alternatives at this time that may be helpful and I did provide a ranged dosing of gabapentin for Augusto to try to see how this might help with nighttime symptoms.   Given the issues with falling asleep and getting up in the morning, there may be some delayed phase circadian rhythm shift. Melatonin can be useful for this when used appropriately (lower doses, taken 3-4 hours prior to intended bedtime), but would not treat other concurrent issues that may also be interfering with sleep. Light box therapy can also be helpful for circadian shifts.   Antiadrenergic medications:   Augusto has evidence of autonomic hyperarousal (elevated fight-or-flight), with high baseline anxiety including physical symptoms and occasional panic attacks.   He has not found clonidine to be helpful for symptoms thus far. He does note that blood pressure has been better since starting it, but does still get some dizziness in the mornings as well. Will not discontinue it at this time to minimize simultaneous med changes, but will check in about this in 2 weeks.   Alcohol dependence:   Augusto does note that he relies on alcohol more often than he would like to, and did like the idea of naltrexone and how it can reduce the drive to drink and diminish the strength of the euphoria generated by drinking. We will discuss this more at next appointment.     [place] PRESCRIPTION MONITORING PROGRAM [] was checked today: indicates taking controlled substances as prescribed    PSYCHOTROPIC DRUG INTERACTIONS: None   MANAGEMENT:  N/A     Plan     1) PSYCHOTROPIC MEDICATION RECOMMENDATIONS:  - Increase to sertraline 100mg daily  - Continue clonidine 0.1mg at bedtime    - Will check in in 2 weeks about whether to discontinue  - May take gabapentin 300-1200mg at bedtime as needed for anxiety / sleep  - May take alprazolam 0.5mg at bedtime as needed for anxiety    [see the  "following Gerard(s) for more information: PSYMEDINFOSERTRALINE - PSYMEDINFOBENZOS]    2) THERAPY: Psychotherapy is a primary recommendation.   I did recommend day treatment or MHCD treatment at this time based on severity of symptoms, but Augusto does not feel like he would be able to handle this at this time.   He is willing to engage in individual therapy currently, will inquire about this through the program.     3) NEXT DUE:   Labs- Routine monitoring is not indicated for current psychotropic medication regimen   ECG- Routine monitoring is not indicated for current psychotropic medication regimen   Rating Scales- N/A    4) REFERRALS / COORDINATION: None    5) DISPOSITION:   - Pt would benefit from brief psychiatric intervention (up to ~5 visits) to adjust meds and gauge for benefit.   - Follow up with Dionisio Grady MD in 4 weeks. Plan to transition med management back to designated prescriber after brief care is complete.     Treatment Risk Statement:  The patient understands the risks, benefits, adverse effects and alternatives. Agrees to treatment with the capacity to do so. No medical contraindications to treatment. Agrees to contact provider for any problems. The patient understands to call 911 or go to the nearest ED if urgent or life threatening symptoms occur. Crisis contact numbers are provided routinely in the After Visit Summary.       PROVIDER:  Dionisio Grady MD       Pertinent Background   Per recent DA on 10/4/21:   Depression-  Patient reports symptoms for the past 2 years of feeling down, depressed, and hopeless, as well as lack of motivation, anhedonia (described it as a \"constant state of boredom\"), insomnia, fatigue, difficulty concentrating/making decisions, fidgety/restlessness, and intermittent decreased appetite. He used to have many hobbies including hunting, carpentry, golfing, and playing video games, but \"none of it brings me pleasure anymore.\" He states that \"I don't remember " "what happy is, content is the best I can get to.\" He reports being a  2+ years ago, which was the last memory he has of being \"happy.\"  Anxiety- Patient reports experiencing \"test anxiety\" as a child, but his anxiety notably worsened about 3-4 years ago. He states he does not \"do the things I used to do, sometimes I can't do anything.\" He notes he takes approximately 1-2 mental health days per month, and received reasonable accommodation to work from home due to his anxiety. He also endorses insomnia, racing thoughts, fidgeting/pacing/restlessness, and difficulty concentrating.  OCD- Patient reports intrusive thoughts that began about 10-12 years ago of death, dying, and \"impending doom\" such as someone breaking into his house. He reports he intermittently tries to suppress these thoughts, particularly at bedtime. He reports compulsive behaviors such as double checking locks about 2x, counting stairs, and being obsessed with even numbers. He reports he does not feel like the compulsions take up too much of his time, but the intrusive thoughts interfere with his social life and ability to concentrate. He states he has been prescribed medication to manage his intrusive thoughts, but has had no relief.       Interval History    Getting to the point where he thinks there could be some benefit with the medication. Maybe a little less brain fog, maybe slightly more motivation, but nothing life changing. A little more willingness to do things.   Has still noted a little bit of the dizziness in the mornings.   Sleep is still an issue, but does acknowledge that this is a long term thing. The weird dreams started with the medication changes, but have now resolved, although does still have some restlessness.   Did reduce the alcohol use the first week after we met. Did slip a bit last week, had about 3 days.        Discussion points from past appointments:   Overall Augusto has not really been able to figure out the " trigger for things getting worse over time. He used to enjoy golfing and doing other things, but just doesn't feel that most of the time now. Has a few good days, but they are rare.   Overall depression may be within the last 5 years or so.   Has episodes where anxiety peaks ~few times per week, where he has to pace and calm himself down. The severe panic attacks happen a few times a year, where he has severe symptoms and it feels like he might die. Things have been worse since COVID. Gets significant issues with irritability. Has punched the wall at times, did hurt / possibly break his hand at one point.   Sleep is very poor. Stays up until 12:30-1 every night because he can't sleep unless he is totally exhausted. Then it is hard to get up in the mornings as well.   Social anxiety is a newer issue that did not seem to be as present before. [place] 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   Has some intrusive SI thoughts.   Does drink to help mitigate symptoms. Tries not to drink every day. Has at one point gone through 1.75L vodka in 1 week. He is ambivalent about substance use. Does note it may not be ideal, but is not sure about whether he is ready to make changes for this. Alcohol does help with anxiety and relaxation. Helps him to enjoy things more.   Prefers video games to TV, enjoys being more engaged, enjoys the social element, and alcohol does help to enjoy things more.   Does feel like he has some attentional issues, may be interested in ADHD assessment.     Pertinent Social Hx:  FINANCIAL SUPPORT- Working as a  for the [employer] for about 10 years. He currently works from home after receiving reasonable accommodation due to his anxiety.   LIVING SITUATION / RELATIONSHIPS- Currently living alone, moved into his house about a year ago. He reports moving here  from [place], [place] about 10 years ago for work, it has been a difficult transition.   SOCIAL/ SPIRITUAL SUPPORT- Has family in [place]. Hasn't made a lot of connections in [place].     Pertinent Substance Use  Alcohol- At peak, went through about 1.75L bottle of [place] in about a week. Has reduced use since that time. Sets rules for himself around use. It does help quite a bit with anxiety which he feels is unfortunate, and does help him to sleep through the night as well.   Nicotine- Used to chew for 10 years, gave up in 2017, but it is hard, helped with anxiety and still craves at times.   Caffeine- 1 cup of coffee some days, mostly none though.   Opioids- None  Narcan Kit- N/A  THC/CBD- Has tried edibles to see about helping with anxiety, but didn't really do much.   Other Illicit Drugs- none     Medical Review of Systems    Dizziness/orthostasis- None  Headaches- None  GI- Chronic low left abdomen pain for the past 3-4 years, which worsens anxiety and depression symptoms. He states he has been seeing doctors for this chronic pain and they have ruled out cancer.      Past Psychiatric History - Summary points of current care   10/2021 - Started sertraline. Switched from propranolol to clonidine.   11/2021 - Increased sertraline. Started gabapentin.      Psychotropic Medication Trials      Medication Max Dose (mg) Dates / Duration Helpful? DC Reason / Adverse Effects?   Fluoxetine? 20? 2011     Lexapro  ~2008-9, 2017  Possible lower energy   sertraline       Paxil    Severe nausea and severe anxiety   alprazolam 0.5  yes    Propranolol  20  no    clonidine 0.1 10/2021 ? Initial odd dreams / restlessness, BP improved   melatonin   no Was groggy, but restless sleep      Past Medical History      Neurologic Hx [head injury, seizures, etc.]: None  No past medical history on file.     Medications      Current Outpatient Medications   Medication Sig Dispense Refill     ALPRAZolam (XANAX) 0.5 MG tablet Take 1 tablet (0.5  mg) by mouth nightly as needed for anxiety 15 tablet 0     cloNIDine (CATAPRES) 0.1 MG tablet Take 1 tablet (0.1 mg) by mouth At Bedtime 30 tablet 0     hyoscyamine (LEVSIN/SL) 0.125 MG sublingual tablet Place 0.125 mg under the tongue 3 times daily as needed       sertraline (ZOLOFT) 50 MG tablet Take 0.5 tablets (25 mg) by mouth daily for 7 days, THEN 1 tablet (50 mg) daily. 30 tablet 0      Physical Exam  (Vitals Only)   There were no vitals taken for this visit.    Pulse Readings from Last 5 Encounters:   No data found for Pulse     Wt Readings from Last 5 Encounters:   No data found for Wt     BP Readings from Last 5 Encounters:   No data found for BP      Mental Status Exam   Alertness: alert  and oriented  Appearance: adequately groomed  Behavior/Demeanor: cooperative, pleasant and calm, with good eye contact   Speech: normal and regular rate and rhythm  Language: intact and no problems  Psychomotor: normal or unremarkable  Mood: depressed and anxious  Affect: full range and appropriate; was congruent to mood  Thought Process/Associations: unremarkable  Thought Content:  Reports passive SI without intent or plan;  Denies violent ideation and delusions  Perception:  Reports none;  Denies auditory hallucinations and visual hallucinations  Insight: intact  Judgment: intact  Cognition: does  appear grossly intact; formal cognitive testing was not done  Gait and Station: not observed     Data      No flowsheet data found.  PHQ-9 SCORE 10/4/2021   PHQ-9 Total Score MyChart 19 (Moderately severe depression)   PHQ-9 Total Score 19     JE-7 SCORE 10/4/2021   Total Score 19 (severe anxiety)   Total Score 19

## 2021-11-08 NOTE — PATIENT INSTRUCTIONS
**For crisis resources, please see the information at the end of this document**     Patient Education      Thank you for coming to the [facility] MENTAL HEALTH & ADDICTION [place] [facility].    Lab Testing:  If you had lab testing today and your results are reassuring or normal they will be mailed to you or sent through [employer] within 7 days. If the lab tests need quick action we will call you with the results. The phone number we will call with results is # [phone] (home) . If this is not the best number please call our clinic and change the number.    Medication Refills:  If you need any refills please call your pharmacy and they will contact us. Our fax number for refills is [phone]. Please allow three business for refill processing. If you need to  your refill at a new pharmacy, please contact the new pharmacy directly. The new pharmacy will help you get your medications transferred.     Scheduling:  If you have any concerns about today's visit or wish to schedule another appointment please call our office during normal business hours [phone] (8-5:00 M-F)    Contact Us:  Please call [phone] during business hours (8-5:00 M-F).  If after clinic hours, or on the weekend, please call  [phone].    Financial Assistance [phone]  [facility] Billing [phone]  Central Billing Office, [facility]: [phone]  [facility] Billing [phone]  Medical Records [phone]  [facility] Patient Bill of Rights [email or web address]       MENTAL HEALTH CRISIS NUMBERS:  For a medical emergency please call  911 or go to the nearest ER.     [facility]:   [facility] -[phone]   Crisis Residence [facility] -[phone]   Walk-In Counseling Center [facility] -[phone]   COPE 24/7 [place] Mobile Team -[phone] (adults)/[phone] (child)  CHILD: [facility] needs assessment  team - [phone]      The Medical Center:   [facility] - [phone]   Walk-in counseling [facility] - [phone]   Walk-in counseling [facility] - [phone]   Crisis Residence [facility] - [phone]  Urgent Care Adult Mental [facility]-[phone] mobile unit/ 24/7 crisis line    National Crisis Numbers:   National Suicide Prevention Lifeline: 5-678-641-TALK (295-680-6821)  Poison Control Center - 7-292-431-2901  Free All Media/resources for a list of additional resources (SOS)  Trans Lifeline a hotline for transgender people 1-645.611.6862  The [employer] Project a hotline for LGBT youth 3-268-030-9431  Crisis Text Line: For any crisis 24/7   To: 980077  see www.crisistextline.org  - IF MAKING A CALL FEELS TOO HARD, send a text!         Again thank you for choosing [facility] MENTAL HEALTH & ADDICTION [facility] and please let us know how we can best partner with you to improve you and your family's health.    You may be receiving a survey regarding this appointment. We would love to have your feedback, both positive and negative. The survey is done by an external company, so your answers are anonymous.

## 2021-11-08 NOTE — TELEPHONE ENCOUNTER
On November 8, 2021, at 9:41 AM, writer called patient at mobile to confirm Virtual Visit. Writer unable to make contact with patient. Writer left detailed voice message for call back, with [phone] left as callback number. Link for Shukri was sent via email to [email or web address]. Nicole Waddell, EMT

## 2021-11-09 ENCOUNTER — BEH TREATMENT PLAN (OUTPATIENT)
Dept: PSYCHIATRY | Facility: CLINIC | Age: 41
End: 2021-11-09

## 2021-11-09 NOTE — PROGRESS NOTES
OUTPATIENT PSYCHOTHERAPY PROGRESS NOTE    Client Name: Augusto Lea   YOB: 1980 (41 year old)   Date of Service:  Oct 25, 2021  Time of Service: 4:17 pm to 4:45 pm (27 minutes)  Service Type(s): non-billable service. Writer and patient experienced technical difficulties and spent much of scheduled time problem solving needs. Eventually became telephone service to provide brief orientation to therapy.    Type of service: Telemedicine Psychotherapy  Reason for Telemedicine Visit: COVID-19 public health recommendations on in-person sessions  Mode of transmission: Secure real time interactive audio and visual telecommunication system (attempted multiple video platforms, ended up being telephonic visit)  Location of originating and distant sites:    Originating site (patient location): patient home    Distant site (provider site): HIPAA compliant location within provider home/remote setting  Telemedicine Visit: The patient's condition can be safely assessed and treated via synchronous audio and visual telemedicine encounter.    Patient has agreed to receiving services via telemedicine technology.    Diagnoses:   Z71.89 Counseling and Coordination of Care    Individuals Present:   Client attended alone    Treatment goal(s) being addressed:   -Identified potential goals for treatment    Subjective:  Augusto reported on-going anxiety (worries about almost everything), brain fog, and amotivation. Symptoms, most specifically amotivation and feeling tired, can vary.    Treatment:   -Provided brief overview of Rapid Access Brief Treatment program and therapy. Writer reviewed that therapy will be limited to 10-12 sessions with focus on specific goals to decrease mental health symptoms.  -Provided reflective listening and validation as patient discussed symptoms and concerns. He has a desire to return to happier days and feels at times he may be close to being happy. He would like to continue to remodel home. Home  was meant to be a project that he could renovate then sell and he has spent several years there. Writer briefly identified cognitive thoughts and how they may be impacting mood (ie anxiety about how much pain he would experience each day).  -Identified potential treatment goals. Augusto would like to focus on increasing amount and quality of sleep. He would like to decrease anxiety. Writer suggested introducing mindfulness and CBT therapy to address potential thought patterns. Augusto agreed to initial plan.  -Agreed to care coordination activity. Augusto recently started new medication. He expressed concern that new medication was impacting sleep. He is experiencing dreams that he has to complete short tasks and then wakes up several times per night. He also reports hearing things, such as knocking sounds and something falling off a desk. Writer will follow up with med provider to discuss next steps.    Assessment and Progress:   This was scheduled to be first session, but was abbreviated due to technical issues. Augusto reported being able to manage session.    Plan:   Writer will follow up with Dr. Grady regarding medication side effect concerns. Augusto will follow up with IT regarding potential audio concerns and reflect on potential goals. Next therapy appointment has been scheduled for 1 week (11/1) to continue work on treatment goals.      Treatment Plan review due: n/a will complete by third billed session.      KRISHNA Vicente (Patty), [facility]    Direct Phone: [phone]  Fax: [phone]    [facility]  [facility], 2nd floor  [address], [address]  [place] [address]

## 2021-11-09 NOTE — PROGRESS NOTES
[facility]  Psychiatry Clinic  First Episode of Psychosis - Strengths Program  Outpatient Treatment Plan Summary       Augusto Lea MRN# [id number]   Age: 41 year old YOB: 1980     Today's Date: 11/09/21    Date of Initial Service: 10/04/2021  Date of INTIAL Treatment Plan: Nov 9, 2021  Last Review/Update Date:  n/a  Today's Date: 11/9/2021  Next 90-Day Review Due:  2/7/22      DSM-V DIAGNOSIS:   JE, 300.02 (F41.1)  Major Depressive Disorder, Recurrent, Moderate (F33.1)    CURRENT SYMPTOMS and circumstances that substantiate the diagnosis:   Whats been going on and when did it first start? (Narrative and/or below review of sxs)     Current symptoms of anxiety started approximately 3-4 years ago, with depressive symptoms following. Current anxiety symptoms include periods of panic, racing thoughts, difficulty with concentration, insomnia, intrusive thoughts (sense of doom), and irritability. Current symptoms of depression include anhedonia, amotivation, feeling down, and suicidal ideation. Although patient seeking support to manage symptoms anxiety and these appear most prevalent, it appears that depressive symptoms are playing an increasingly significant role in patient's functioning.     There are medical comorbidities which impact this treatment, including chronic stomach pain.     How symptoms and/or behaviors are affecting level of functioning:     Current symptoms represent a change in functioning in the following areas: home-chores, work, social-strangers and social-friends. Identified psychosocial factors that could potentially interact with symptoms and functioning include Medical Comorbidites. More specifically, Augusto identifies periods of being unable to work several times each month and has been approved for accommodations at work, including working from home. He reports limiting social activities due to anxiety and depression symptoms and is having  difficulty completing tasks around his home. He has also reported punching walls and hurting his hand due to irritability and anxiety. Of note, patient reports consuming a significant amount of alcohol each week. It is recommended that he find ways to decrease substance use.     RISK ASSESSMENT:   SUICIDALITY:   Assessed Level of Immediate Risk: Low reports SI, Ideation: Yes, Plan: Yes, Means: No, Intent: No    HOMICIDE/VIOLENCE:   Assessed Level of Immediate Risk: None, Ideation: No, Plan: No, Means: No, Intent: No    Safety plan was discussed and included review of crisis phone numbers within the county of residence, and examples of when to contact them.  Additionally, discussed seeking assistance via 911 or local ED should patient begin to feel unsafe and have increased feelings of suicide.    MEDICATIONS:      Current Outpatient Medications   Medication Sig Dispense Refill     ALPRAZolam (XANAX) 0.5 MG tablet Take 1 tablet (0.5 mg) by mouth nightly as needed for anxiety 15 tablet 0     cloNIDine (CATAPRES) 0.1 MG tablet Take 1 tablet (0.1 mg) by mouth At Bedtime 30 tablet 0     gabapentin (NEURONTIN) 300 MG capsule Take 1-4 capsules (300-1,200 mg) by mouth nightly as needed (for anxiety / sleep) 120 capsule 0     hyoscyamine (LEVSIN/SL) 0.125 MG sublingual tablet Place 0.125 mg under the tongue 3 times daily as needed       sertraline (ZOLOFT) 100 MG tablet Take 1 tablet (100 mg) by mouth daily 30 tablet 0       TREATMENT  PLAN:     Overall Goal: I don't want to worry as much. There are are people that don't worry at all, I'm the complete opposite. I would like to be somewhere in the middle.   Measurable Objectives Interventions Target Dates & Discharge Criteria   Individual s Objectives    -Decrease anxiety symptoms as measured by JE-7 and self-report.    -Learn mindfullness and other strategies to manage anxiety symptoms.    -Learn and utilize coping strategies to increase sleep.    -Increase motivation in  completing activities, including working on house projects.    -Decrease alcohol intake.   -psychoeducation and practice on mindfullness techniques    -CBT and Solutions Focused therapy to manage anxiety symptoms    -psychoeducation on sleep hygiene. CBT and mindfullness strategies focusing on improved sleep length and quality.    -behavioral activation and Solutions Focused therapy to decrease amotivation    -harm reduction and CBT strategies    -medication management via Dr. Grady Target date:   3 months from 10/25/2021    Discharge criteria:  Marked and sustained symptom improvement     Augusto demonstrates understanding of symptoms and strategies.    Therapy provided through [facility] and will consist of 10-12 sessions.              1. Frequency of Sessions:  weekly  2. Discharge and Aftercare Goals: upon relief of symptoms or end of program with [facility]  3. Expected duration of treatment:  3 Months   4. Participants in therapy plan (family, friends, support network): none unless requested by Augusto      See encounter dated 11/12/2021 with Rose Marie Enriquez [facility]  for acknowledged consent of current treatment plan      Regulatory Guidelines for Updating Treatment Plan  [place] Medical Assistance: Reviewed & signed at least every 90days  Medicare:  Update per policy

## 2021-11-12 ENCOUNTER — VIRTUAL VISIT (OUTPATIENT)
Dept: PSYCHIATRY | Facility: CLINIC | Age: 41
End: 2021-11-12
Attending: SOCIAL WORKER
Payer: COMMERCIAL

## 2021-11-12 DIAGNOSIS — F33.1 MAJOR DEPRESSIVE DISORDER, RECURRENT EPISODE, MODERATE (H): ICD-10-CM

## 2021-11-12 DIAGNOSIS — F41.1 GENERALIZED ANXIETY DISORDER: Primary | ICD-10-CM

## 2021-11-12 PROCEDURE — 90837 PSYTX W PT 60 MINUTES: CPT | Mod: GT | Performed by: SOCIAL WORKER

## 2021-11-22 ENCOUNTER — VIRTUAL VISIT (OUTPATIENT)
Dept: PSYCHIATRY | Facility: CLINIC | Age: 41
End: 2021-11-22
Attending: SOCIAL WORKER
Payer: COMMERCIAL

## 2021-11-22 DIAGNOSIS — F33.1 MAJOR DEPRESSIVE DISORDER, RECURRENT EPISODE, MODERATE (H): ICD-10-CM

## 2021-11-22 DIAGNOSIS — F41.1 GENERALIZED ANXIETY DISORDER: Primary | ICD-10-CM

## 2021-11-22 PROCEDURE — 90837 PSYTX W PT 60 MINUTES: CPT | Mod: GT | Performed by: SOCIAL WORKER

## 2021-11-26 NOTE — PROGRESS NOTES
OUTPATIENT PSYCHOTHERAPY PROGRESS NOTE    Client Name: Augusto Lea   YOB: 1980 (41 year old)   Date of Service:  Nov 12, 2021  Time of Service: 3:00 pm to 4:00 pm (60 minutes)  Service Type(s):61953 psychotherapy (53-60 min. with patient and/or family)    Type of service: Telemedicine Psychotherapy  Reason for Telemedicine Visit: COVID-19 public health recommendations on in-person sessions  Mode of transmission: Secure real time interactive audio and visual telecommunication system (videoconference via [employer])  Location of originating and distant sites:    Originating site (patient location): patient home    Distant site (provider site): HIPAA compliant location within provider home/remote setting  Telemedicine Visit: The patient's condition can be safely assessed and treated via synchronous audio and visual telemedicine encounter.    Patient has agreed to receiving services via telemedicine technology.    Diagnoses:   F41.1   Generalized Anxiety Disorder  F33.1   Major Depressive Disorder, Recurrent, Moderate    Individuals Present:   Client attended alone    Treatment goal(s) being addressed:   -Identify treatment goals  -Identify strategies to increase sleep  -Identify strategies to decrease alcohol use    Subjective:  Augusto was able to complete homework assignment. He reports some increased anxious/depressed thoughts and increased alcohol use this week.     Treatment:   -Reviewed treatment plan. Writer reviewed plan created based on past discussions. Augusto agreed to goals on plan.  -Checked in on homework. Augusto reports that he has been taking warm showers before bed and this has been somewhat helpful. He finds it difficult to get out of bed if he is awake for a longer period of time. He has not tried calming scents, mostly because he is unsure of what he wants to try. He has tried some meditation apps and thinks they could be helpful. Augusto reports an increase in alcohol use this week, consuming  alcohol 4 nights. He reports frustration within himself and wonders if he has a drinking problem.  -Used motivational interviewing strategies to discuss Augusto's views on alcohol use. Augusto reports some ambivalence about change. He notes that alcohol helps decrease/mask anxious thoughts and he sleeps. He does not feel well the next morning and often feels more anxious. He has tried to limit himself by telling himself he can only drink so much or by not purchasing more at the store, but these have not always been helpful strategies. He has thought about trying medical marijuana as a replacement option. He appears to be in contemplative/planning stage of change. He does not appear to want to abstain from alcohol at this point, but decrease use.  -Identified harm reduction techniques. Writer suggested strategy of keeping alcohol in garage/separate area of home and only having a certain amount in the home. Provided psychoeducation on short term vs long term impact of alcohol use on depression and anxiety symptoms.   -Briefly introduced cognitive behavioral skills work. Augusto shared perfectionistic standards related to activities he completes. He notes it is very hard to start projects as he wants to make sure they go perfectly and it can feel very overwhelming. Writer worked on identifying underlying thought patterns related to what could happen if he did not do something perfectly. Writer briefly shared concept of interaction of thoughts, emotions, actions and will continue to introduce in coming weeks.     Assessment and Progress:   Augusto continues to be engaged in treatment and has been working on strategies between session. He is becoming increasingly aware of the impact of alcohol use and would like to make changes in this area.     Mental Status Exam    Alertness: alert  and oriented  Appearance: well groomed  Behavior/Demeanor: cooperative, with good  eye contact   Speech: normal and regular rate and rhythm  Language:  intact  Psychomotor: normal or unremarkable  Mood: depressed and anxious  Affect: full range; was congruent to mood; was congruent to content  Thought Process/Associations: unremarkable  Thought Content:  Reports none;  Denies none  Perception:  Reports none;  Denies none  Insight: good  Judgment: fair  Cognition: (6) does  appear grossly intact; formal cognitive testing was not done  Gait and Station: unable to assess due to video visit    Plan:   Homework assigned of attempting harm reduction strategies and to continue to with sleep hygiene strategies. Next therapy appointment has been scheduled for 11/22/2021 to continue work on treatment goals.      Treatment Plan review due: 2/07/2022      KRISHNA Vicente (Patty), [facility]    Direct Phone: [phone]  Fax: [phone]    [facility]  [facility], 2nd floor  [address], [address]  [place] [address]

## 2021-11-26 NOTE — PROGRESS NOTES
OUTPATIENT PSYCHOTHERAPY PROGRESS NOTE    Client Name: Augusto Lea   YOB: 1980 (41 year old)   Date of Service:  Nov 22, 2021  Time of Service: 4;02 pm to 5:02 pm (60 minutes)  Service Type(s):88613 psychotherapy (53-60 min. with patient and/or family)    Type of service: Telemedicine Psychotherapy  Reason for Telemedicine Visit: COVID-19 public health recommendations on in-person sessions  Mode of transmission: Secure real time interactive audio and visual telecommunication system (videoconference via [employer]  Location of originating and distant sites:    Originating site (patient location): patient home    Distant site (provider site): HIPAA compliant location within provider home/remote setting  Telemedicine Visit: The patient's condition can be safely assessed and treated via synchronous audio and visual telemedicine encounter.    Patient has agreed to receiving services via telemedicine technology.    Diagnoses:   F41.1   Generalized Anxiety Disorder  F33.1   Major Depressive Disorder, Recurrent, Moderate    Individuals Present:   Client attended alone    Treatment goal(s) being addressed:   -Decrease anxiety symptoms, including through use of mindfullness strategies  -Improve sleep  -Increase motivation in completing tasks  -Decrease alcohol intake     Subjective:  Augusto reported increased symptoms this week. He worked on homework strategies for decreasing alcohol use. They were somewhat effective but he reports on-going cravings.    Treatment:   -Reviewed homework. Augusto tried not to have alcohol in his home and to limit it when he did. He reports some success. However, he also reports frustration that he continues to experience cravings related to alchol. He notes that it is difficult to abstain when anxiety is high. He reports racing thougths and likens it to feeling like he has worms in his brain. He notes even when thoughts are not anxiety driven, they are often racing.   -Started  "psychoeducation on CBT and anxiety \"reward\" loops.Writer provided illustration of different types of reward and other loops that can maintain and strengthen anxiety.   -Provided reflective listening and validation as Augusto discussed strategies he has tried to manage anxieyt and motivation as well as his thoughts about potential ADHD symptoms. Augusto expressed frustration as he has done some of his own readings on the subject and has tried to utilize alternative patterns and look at alternative rewards to boht decrease anxiety and increase motivation, with minimal change. Worked on problem solving using current hope to do more work on his home. This has been more difficult due to the way his home was built (plaster vs drywall). Writer provided psychoeducation on symptoms and diagnosing of ADHD, including looking for evidence of symptoms prior to age 13. Augusto did well in school and there is a possibility that he was able to find coping strategies to mask symptoms at that time. Will continue to discuss with treatment team.    Assessment and Progress:   Augusto has been active in trying strategies and completing homework. Symptoms of anxiety and self-medicating with alcohol continue to be present and impact well-being. Writer will work on focusing specifically on CBT related strategies in future sessions.     Mental Status Exam    Alertness: alert  and oriented  Appearance: well groomed  Behavior/Demeanor: cooperative, with fair  eye contact   Speech: normal and regular rate and rhythm  Language: no problems  Psychomotor: sits forward or near front of chair  Mood: depressed and anxious  Affect: full range; was congruent to mood; was congruent to content  Thought Process/Associations: unremarkable  Thought Content:  Reports none;  Denies none  Perception:  Reports none;  Denies none  Insight: good  Judgment: good  Cognition: (6) does  appear grossly intact; formal cognitive testing was not done  Gait and Station: unable to " assess due to video visit    Plan:   Homework assigned of continuing to work on decreasing alcohol use. Writer will send information on Unwinding Anxiety book.Next therapy appointment has been scheduled for 11/29/21 to continue work on treatment goals.      Treatment Plan review due: 2/07/2022      KRISHNA Vicente (Patty), [facility]    Direct Phone: [phone]  Fax: [phone]    [facility]  [facility], 2nd floor  [address], [address]  [place] [address]

## 2021-11-26 NOTE — PROGRESS NOTES
OUTPATIENT PSYCHOTHERAPY PROGRESS NOTE    Client Name: Augusto Lea   YOB: 1980 (41 year old)   Date of Service:  Nov 1, 2021  Time of Service: 4:00 pm to 5:00 pm (60 minutes)  Service Type(s):28805 psychotherapy (53-60 min. with patient and/or family)    Type of service: Telemedicine Psychotherapy  Reason for Telemedicine Visit: COVID-19 public health recommendations on in-person sessions  Mode of transmission: Secure real time interactive audio and visual telecommunication system (videoconference via [employer])  Location of originating and distant sites:    Originating site (patient location): patient home    Distant site (provider site): HIPAA compliant location within provider home/remote setting  Telemedicine Visit: The patient's condition can be safely assessed and treated via synchronous audio and visual telemedicine encounter.    Patient has agreed to receiving services via telemedicine technology.    Diagnoses:   F41.1 Generalized Anxiety Disorder  F33.1 Major Depressive Disorder, Recurrent, Moderate    Individuals Present:   Client attended alone    Treatment goal(s) being addressed:   -Identify treatment goals  -Identify strategies to increase sleep    Subjective:  Augusto reports some decrease in symptoms since previous session, likely due to medications starting to become more effective. He reports some decrease in alcohol use in past week.    Treatment:   - Provided psychoeducation on sleep hygiene. Utilized handout to go over effective sleep management techniques, including trying to have same scheduled every night, taking a shower or other relaxing activity as part of bedtime routine, getting out of bed if not awake for more than 10-15 minutes, and introducing calming scents around bedtime.  -uAgusto identified skills he would like to try before next week, including scents and trying to set  a regular sleep routine.  -Introduced mindfulness practice. Reviewed purpose of mindfulness is not  to clear the mind but to become more aware of when the mind was becoming distracted. Practiced with 3 minute meditation.     Assessment and Progress:   Augusto was active and engaged in therapy session. He was able to share strategies that he thought would be helpful while being open about what did not feel helpful.     Mental Status Exam    Alertness: alert  and oriented  Appearance: well groomed  Behavior/Demeanor: cooperative, with good  eye contact   Speech: normal and regular rate and rhythm  Language: intact  Psychomotor: normal or unremarkable  Mood: depressed and anxious  Affect: full range; was congruent to mood; was congruent to content  Thought Process/Associations: unremarkable  Thought Content:  Reports none;  Denies none  Perception:  Reports none;  Denies none  Insight: good  Judgment: good  Cognition: (6) does  appear grossly intact; formal cognitive testing was not done  Gait and Station: unable to assess due to video visit          Plan:   Homework assigned of trying out scents and at least 2 other suggestions for sleep hygiene. Next therapy appointment has been scheduled for 11/9/2021 to continue work on treatment goals.      Treatment Plan review due: n/a first billable session      KRISHNA Vicente (Patty), [facility]    Direct Phone: [phone]  Fax: [phone]    [facility]  [facility], 2nd floor  [address], [address]  [place] [address]

## 2021-11-29 ENCOUNTER — VIRTUAL VISIT (OUTPATIENT)
Dept: PSYCHIATRY | Facility: CLINIC | Age: 41
End: 2021-11-29
Attending: SOCIAL WORKER
Payer: COMMERCIAL

## 2021-11-29 DIAGNOSIS — F41.1 GENERALIZED ANXIETY DISORDER: Primary | ICD-10-CM

## 2021-11-29 DIAGNOSIS — F33.1 MAJOR DEPRESSIVE DISORDER, RECURRENT EPISODE, MODERATE (H): ICD-10-CM

## 2021-11-29 PROCEDURE — 90837 PSYTX W PT 60 MINUTES: CPT | Mod: GT | Performed by: SOCIAL WORKER

## 2021-12-03 NOTE — PROGRESS NOTES
OUTPATIENT PSYCHOTHERAPY PROGRESS NOTE    Client Name: Augusto Lea   YOB: 1980 (41 year old)   Date of Service:  Nov 29, 2021  Time of Service: 4:01 pm to 5:01 pm (60 minutes)  Service Type(s):62996 psychotherapy (53-60 min. with patient and/or family)    Type of service: Telemedicine Psychotherapy  Reason for Telemedicine Visit: COVID-19 public health recommendations on in-person sessions  Mode of transmission: Secure real time interactive audio and visual telecommunication system (videoconference via [employer])  Location of originating and distant sites:    Originating site (patient location): patient home    Distant site (provider site): HIPAA compliant location within provider home/remote setting  Telemedicine Visit: The patient's condition can be safely assessed and treated via synchronous audio and visual telemedicine encounter.    Patient has agreed to receiving services via telemedicine technology.    Diagnoses:   F41.1   Generalized Anxiety Disorder  F33.1   Major Depressive Disorder, Recurrent, Moderate    Individuals Present:   Client attended alone    Treatment goal(s) being addressed:   -Decrease anxiety symptoms, including through use of mindfulness strategies  -Improve sleep  -Increase motivation in completing tasks  -Decrease alcohol intake    Subjective:  Augusto reports varying symptoms in the past week. He had one day where he felt good, like his old self. He reported 3 nights of alcohol use. He has been working on using strategies discussed in sessions, with mixed results. He notes decreased stomach pain, which has been helpful in decreasing depression symptoms.    Treatment:   -Reviewed homework. Provided reflective listening and validation as Augusto shared experienced from the week. Jeremy notes attempting to use harm reduction techniques to decrease alcohol consumption. He is also continuing to try the sleep hygiene techniques. Anxiety and running thoughts often make it difficult to  "go to sleep. Gabapentin has been helpful, however he is not sure how it will react to alcohol so refrains from using it on these days. Writer worked on identifying cycles of thoughts, alcohol use, and symptoms following day. Will follow up with provider regarding medication questions.  -Started discussion of 3 points of CBT triangle. Provided information on thoughts, emotions, actions. Utilized general example of a friend ignoring you when you see on side walk to illustrate interconnections. Augusto independently identified multiple potential reasons behind actions. He notes this is not difficult for him, however he struggles with identifying thoughts in the moment.  -Discussed importance of practice of skills. Writer likened effective use of CBT skills to power-lifting, noting that no one would expect him to lift 200 pounds first time in gym. Discussed importance of on-going practice and being patient/gentle with self while learning and trying out the skills.  -Utilized CBT triangle to identify potential thoughts, emotions, and actions that occurred during a recent event in Augusto's life. Augusto able to identify situation of playing video games online with friends and having a frustrating interaction. Writer introduced thought log worksheet to help Augusto explore potential connections between emotions, thoughts and actions. Able to create chain of thoughts/events.   -Identified potential Stuck Point. Augusto shared awareness that his father also likely deals with anxiety but does not share symptoms. He shared believe that he should not feel anxious or depressed because he has nothing wrong with his life. Writer created into Stuck Point belief of \"Something is wrong with me to feel anxious or depressed if life is going well.\" will continue to address in future sessions.  -Discussed homework assignment of completing Thought Log daily. Problem solved worries about ability to complete.    Assessment and Progress:   Augusto continues " to be an active participant in treatment and expresses strong motivation to find relief from symptoms. Through his own reflection and research, he appears able to create alternative thoughts. Current focus will be to help him slow down process of identifying thoughts and emotions related to events in order to gain increased insight into symptoms.    Mental Status Exam    Alertness: alert  and oriented  Appearance: well groomed  Behavior/Demeanor: cooperative, with good  eye contact   Speech: normal and regular rate and rhythm  Language: no problems  Psychomotor: normal or unremarkable  Mood: depressed and anxious  Affect: full range; was congruent to mood; was congruent to content  Thought Process/Associations: unremarkable  Thought Content:  Reports none;  Denies none  Perception:  Reports none;  Denies none  Insight: good  Judgment: good  Cognition: (6) does  appear grossly intact; formal cognitive testing was not done  Gait and Station: unable to assess due to video visit      Plan:   Complete thought log daily. Next therapy appointment has been scheduled for December 6, 2021 to continue work on treatment goals.      Treatment Plan review due: 2/07/2022      KRISHNA Vicente (Patty), [facility]    Direct Phone: [phone]  Fax: [phone]    [facility]  [facility], 2nd floor  [address], [address]  [place] [address]

## 2021-12-08 ENCOUNTER — VIRTUAL VISIT (OUTPATIENT)
Dept: PSYCHIATRY | Facility: CLINIC | Age: 41
End: 2021-12-08
Attending: PSYCHIATRY & NEUROLOGY
Payer: COMMERCIAL

## 2021-12-08 ENCOUNTER — TELEPHONE (OUTPATIENT)
Dept: PSYCHIATRY | Facility: CLINIC | Age: 41
End: 2021-12-08

## 2021-12-08 DIAGNOSIS — R41.840 ATTENTION DEFICIT: ICD-10-CM

## 2021-12-08 DIAGNOSIS — F33.1 MODERATE EPISODE OF RECURRENT MAJOR DEPRESSIVE DISORDER (H): Primary | ICD-10-CM

## 2021-12-08 DIAGNOSIS — F10.20 UNCOMPLICATED ALCOHOL DEPENDENCE (H): ICD-10-CM

## 2021-12-08 DIAGNOSIS — F41.1 GENERALIZED ANXIETY DISORDER: ICD-10-CM

## 2021-12-08 DIAGNOSIS — F51.05 INSOMNIA DUE TO OTHER MENTAL DISORDER: ICD-10-CM

## 2021-12-08 DIAGNOSIS — F99 INSOMNIA DUE TO OTHER MENTAL DISORDER: ICD-10-CM

## 2021-12-08 PROCEDURE — 99215 OFFICE O/P EST HI 40 MIN: CPT | Mod: GT | Performed by: PSYCHIATRY & NEUROLOGY

## 2021-12-08 RX ORDER — NALTREXONE HYDROCHLORIDE 50 MG/1
50 TABLET, FILM COATED ORAL DAILY
Qty: 30 TABLET | Refills: 1 | Status: SHIPPED | OUTPATIENT
Start: 2021-12-08 | End: 2022-02-03

## 2021-12-08 RX ORDER — SERTRALINE HYDROCHLORIDE 100 MG/1
200 TABLET, FILM COATED ORAL DAILY
Qty: 60 TABLET | Refills: 1 | Status: SHIPPED | OUTPATIENT
Start: 2021-12-08 | End: 2022-02-03

## 2021-12-08 RX ORDER — GABAPENTIN 300 MG/1
300-1200 CAPSULE ORAL
Qty: 120 CAPSULE | Refills: 1 | Status: SHIPPED | OUTPATIENT
Start: 2021-12-08 | End: 2022-02-03

## 2021-12-08 RX ORDER — ALPRAZOLAM 0.5 MG
0.5 TABLET ORAL
Qty: 15 TABLET | Refills: 0 | Status: SHIPPED | OUTPATIENT
Start: 2021-12-08 | End: 2022-01-28

## 2021-12-08 ASSESSMENT — PAIN SCALES - GENERAL: PAINLEVEL: NO PAIN (0)

## 2021-12-08 NOTE — PROGRESS NOTES
"VIDEO VISIT  Augusto Lea is a 41 year old patient that has consented to receive services via billable video visit.      The patient has been notified of following:   \"This video visit will be conducted via a call between you and your physician/provider. We have found that certain health care needs can be provided without the need for an in-person physical exam. This service lets us provide the care you need with a video conversation. If a prescription is necessary we can send it directly to your pharmacy. If lab work is needed we can place an order for that and you can then stop by our lab to have the test done at a later time. Insurers are generally covering virtual visits as they would in-office visits so billing should not be different than normal.  If for some reason you do get billed incorrectly, you should contact the billing office to correct it and that number is in the AVS .    Patient will join video visit via:  [employer] (Patient / guardian confirmed to join via CarZen)    If patient attempts to join the video via Car[employer] at appointment start time, but is unable to, they would prefer that the provider send them a video invitation via:   Send to preferred e-mail: [email or web address]      How would patient like to obtain AVS?:  Car[employer]    Telehealth Details  Type of service:  video visit for consult  Date of service: Dec 8, 2021  Time of service:    Start Time:  1:05 PM    End Time:  1:44 PM    Reason for video visit:  Services only offered telehealth  Originating Site (patient location):  Patient's home  Distant Site (provider location):  [facility]  Mode of Communication:  Video conference via [facility]  Rapid Access Brief Treatment Program  MEDICAL PROGRESS NOTE #3     Augusto Lea is a 41 year old with history of anxiety.     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 Depression is a newer issue, having started more gradually over the last few years, and is closely associated with the anxiety. Also notes a pattern of finding alcohol more helpful than he would prefer in helping to reduce anxiety and insomnia. Actively tries to limit use, but does tend to rely on it when things get more severe.   We did discuss his history with attentional symptoms. I do think it would be worth having him explore ADHD evaluation. We did discuss that even with this, stimulant prescribing can be complicated, but the assessment may be helpful with targeting symptoms and figuring out the interplay between attentional symptoms and anxiety.   - Pros: Can be helpful for discerning specific strengths / weaknesses, and specific cognitive areas to focus interventions. Also may help parsing mood symptoms from primary attentional symptoms, and may even result in a diagnosis of ADHD, depending on many factors.   - Cons: May not result in a diagnosis, and regardless, this does not address the other factors that complicate stimulant prescribing.   Many times the recommendation is to address mood symptoms to be able to better assess attentional needs.   Psychotherapy discussion: Primary recommendation for the treatment of mood symptoms, especially anxiety. Supportive therapy can be useful for reducing the impact of acute or chronic psychosocial stressors. CBT can b particularly helpful for ruminative thinking and addressing maladaptive coping mechanisms that may worsen anxiety.   Medication discussion:   Primary mood support medications:   Augusto hasn't noticed any major improvements since starting sertraline, but has  noticed some minor improvement recently. He did note possibly having some slightly improved motivation and less brain fog, so this may be a good sign. Given that he has tolerated it well, and that things are at least headed in the right direction, I recommended increasing to 200mg at this time.   Short acting anxiolytic medications:   Augusto does depend frequently on short acting anxiolytic substances (alcohol and alprazolam) to reduce anxiety and insomnia symptoms. We discussed in detail the complex interaction between anxiety and substances, and how short acting substances do perpetuate anxiety and usually worsen it over time, prevent it from improving.   I am okay with continuing the alprazolam for the moment to minimize simultaneous medication changes, but Augusto is aware that it is the most potent short acting anxiolytic that exists, and that it is therefore also the most reinforcing to anxiety.   We did also talk about alternatives at this time that may be helpful and I did provide a ranged dosing of gabapentin for Augusto to try to see how this might help with nighttime symptoms.   Given the issues with falling asleep and getting up in the morning, there may be some delayed phase circadian rhythm shift. Melatonin is often used for this, but unfortunately caused Augusto to feel more restless and caused strange dreams. Light box therapy can also be helpful for circadian shifts.   Antiadrenergic medications:   Augusto has evidence of autonomic hyperarousal (elevated fight-or-flight), with high baseline anxiety including physical symptoms and occasional panic attacks. Clonidine was not helpful for symptoms and actually seemed to interfere with sleep. Guanfacine may be another option to consider, especially given his concerns about ADHD.   Alcohol dependence:   Augusto does note that he relies on alcohol more often than he would like to, and did like the idea of naltrexone and how it can reduce the drive to drink and diminish the  strength of the euphoria generated by drinking.   He is interested in trying this, and will give it a shot at this time. We did discuss that the Vivitrol IM form of naltrexone may also be worth considering if the PO form is partially but not completely effective.     [place] PRESCRIPTION MONITORING PROGRAM [] was checked today: indicates taking controlled substances as prescribed    PSYCHOTROPIC DRUG INTERACTIONS: None   MANAGEMENT:  N/A     Plan     1) PSYCHOTROPIC MEDICATION RECOMMENDATIONS:  - Increase to sertraline 200mg daily  - Start naltrexone 50mg daily  - May take gabapentin 300-1200mg at bedtime as needed for anxiety / sleep  - May take alprazolam 0.5mg at bedtime as needed for anxiety    [see the following SmartPhrase(s) for more information: PSYMEDINFOSERTRALINE - PSYMEDINFOBENZOS]    2) THERAPY: Psychotherapy is a primary recommendation.   Currently engaged in individual therapy with Jennifer Enriquez through [facility].     3) NEXT DUE:   Labs- Routine monitoring is not indicated for current psychotropic medication regimen   ECG- Routine monitoring is not indicated for current psychotropic medication regimen   Rating Scales- N/A    4) REFERRALS / COORDINATION: Placed referral for ADHD evaluation 12/8/21.     5) DISPOSITION:   - Pt would benefit from brief psychiatric intervention (up to ~5 visits) to adjust meds and gauge for benefit.   - Follow up with Dionisio Grady MD in 6 weeks. Plan to transition med management back to designated prescriber after brief care is complete.     Treatment Risk Statement:  The patient understands the risks, benefits, adverse effects and alternatives. Agrees to treatment with the capacity to do so. No medical contraindications to treatment. Agrees to contact provider for any problems. The patient understands to call 911 or go to the nearest ED if urgent or life threatening symptoms occur. Crisis contact numbers are provided routinely in the After Visit Summary.  "      PROVIDER:  Dionisio Grady MD    40 min spent on the date of the encounter in chart review, patient visit, review of tests, documentation, care coordination, and/or discussion with other providers about the issues documented above.     Pertinent Background   Per recent DA on 10/4/21:   Depression-  Reports symptoms for the past 2 years of feeling down, depressed, and hopeless, as well as lack of motivation, anhedonia (described it as a \"constant state of boredom\"), insomnia, fatigue, difficulty concentrating/making decisions, fidgety/restlessness, and intermittent decreased appetite. He used to have many hobbies including hunting, carpentry, golfing, and playing video games, but \"none of it brings me pleasure anymore.\" He states that \"I don't remember what happy is, content is the best I can get to.\" He reports being a  2+ years ago, which was the last memory he has of being \"happy.\"  Anxiety- Patient reports experiencing \"test anxiety\" as a child, but his anxiety notably worsened about 3-4 years ago. He states he does not \"do the things I used to do, sometimes I can't do anything.\" He notes he takes approximately 1-2 mental health days per month, and received reasonable accommodation to work from home due to his anxiety. He also endorses insomnia, racing thoughts, fidgeting/pacing/restlessness, and difficulty concentrating.  OCD- Patient reports intrusive thoughts that began about 10-12 years ago of death, dying, and \"impending doom\" such as someone breaking into his house. He reports he intermittently tries to suppress these thoughts, particularly at bedtime. He reports compulsive behaviors such as double checking locks about 2x, counting stairs, and being obsessed with even numbers. He reports he does not feel like the compulsions take up too much of his time, but the intrusive thoughts interfere with his social life and ability to concentrate. He states he has been prescribed medication to manage " his intrusive thoughts, but has had no relief.       Interval History    Augusto notes that things might be going a little better since we last talked.   Sleep has been pretty hit or miss. Gabapentin (900mg) may result in a little easier time falling asleep, although still a lot of tossing and turning. Did notice feeling a little dizzier / loopier when waking up, but goes away after 30-60 minutes or so.   Has been working on reducing alcohol use. Doesn't really intend to drink, but does sometimes tend to just have a craving to drink in the evenings.   Augusto has been talking with his family more about mental health now that he is getting treatment, and finding out more about his father having lifelong anxiety. He also spoke with his mom about ADHD. Notes that he was never physically hyper in childhood, but does think that he had a lot of trouble with attention, mind racing, things like that, going back to childhood. He did have issues in school, but never told his mom about this. Doesn't think they would have taken him in anyway as this wasn't the kind of thing that they did in the family.   Did discontinue clonidine because it seemed to not help, and made sleep a little worse, caused some weird dreams, more restlessness.        Discussion points from past appointments:   Did reduce the alcohol use the first week after we met, but has had difficulty maintaining. Is motivated to cut back but has cravings. Does drink to help mitigate symptoms. Tries not to drink every day. Has at one point gone through 1.75L vodka in 1 week. He is ambivalent about substance use. Does note it may not be ideal, but is not sure about whether he is ready to make changes for this. Alcohol does help with anxiety and relaxation. Helps him to enjoy things more. Prefers video games to TV, enjoys being more engaged, enjoys the social element, and alcohol does help to enjoy things more.   Overall Augusto has not really been able to figure out the  trigger for things getting worse over time. He used to enjoy golfing and doing other things, but just doesn't feel that most of the time now. Has a few good days, but they are rare.   Has episodes where anxiety peaks ~few times per week, where he has to pace and calm himself down. The severe panic attacks happen a few times a year, where he has severe symptoms and it feels like he might die. Things have been worse since COVID. Gets significant issues with irritability. Has punched the wall at times, did hurt / possibly break his hand at one point.   Sleep is very poor. Stays up until 12:30-1 every night because he can't sleep unless he is totally exhausted. Then it is hard to get up in the mornings as well.   Social anxiety is a newer issue that did not seem to be as present before. [place] 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     Pertinent Social Hx:  FINANCIAL SUPPORT- Working as a  for the [employer] for about 10 years. He currently works from home after receiving reasonable accommodation due to his anxiety.   LIVING SITUATION / RELATIONSHIPS- Currently living alone, moved into his house about a year ago. He reports moving here from [place], [place] about 10 years ago for work, it has been a difficult transition.   SOCIAL/ SPIRITUAL SUPPORT- Has family in [place]. Hasn't made a lot of connections in [place].     Pertinent Substance Use  Alcohol- At peak, went through about 1.75L bottle of [place] in about a week. Has reduced use since that time. Sets rules for himself around use. It does help quite a bit with anxiety which he feels is unfortunate, and does help him to sleep through the night as well.   Nicotine- Used to chew for 10 years, gave up in 2017, but it is hard, helped with anxiety and still craves at times.   Caffeine- 1 cup of coffee some days, mostly none  though.   Opioids- None  Narcan Kit- N/A  THC/CBD- Has tried edibles to see about helping with anxiety, but didn't really do much.   Other Illicit Drugs- none     Medical Review of Systems    Dizziness/orthostasis- None  Headaches- None  GI- Chronic low left abdomen pain for the past 3-4 years, which worsens anxiety and depression symptoms. He states he has been seeing doctors for this chronic pain and they have ruled out cancer.      Past Psychiatric History - Summary points of current care   10/2021 - Started sertraline. Switched from propranolol to clonidine.   11/2021 - Increased sertraline. Started gabapentin.   12/2021 - Increased to sertraline 200mg. Started naltrexone.      Psychotropic Medication Trials      Medication Max Dose (mg) Dates / Duration Helpful? DC Reason / Adverse Effects?   Fluoxetine? 20? 2011     Lexapro  ~2008-9, 2017  Possible lower energy   sertraline 200 10/2021     Paxil    Severe nausea and severe anxiety   alprazolam 0.5  yes    Propranolol  20  no    clonidine 0.1 10/2021 ? Initial odd dreams / restlessness, BP improved   melatonin   no Was groggy, but restless sleep   naltrexone 50 12/2021        Past Medical History      Neurologic Hx [head injury, seizures, etc.]: None  No past medical history on file.     Medications      Current Outpatient Medications   Medication Sig Dispense Refill     ALPRAZolam (XANAX) 0.5 MG tablet Take 1 tablet (0.5 mg) by mouth nightly as needed for anxiety 15 tablet 0     cloNIDine (CATAPRES) 0.1 MG tablet Take 1 tablet (0.1 mg) by mouth At Bedtime 30 tablet 0     gabapentin (NEURONTIN) 300 MG capsule Take 1-4 capsules (300-1,200 mg) by mouth nightly as needed (for anxiety / sleep) 120 capsule 0     hyoscyamine (LEVSIN/SL) 0.125 MG sublingual tablet Place 0.125 mg under the tongue 3 times daily as needed       sertraline (ZOLOFT) 100 MG tablet Take 1 tablet (100 mg) by mouth daily 30 tablet 0      Physical Exam  (Vitals Only)   There were no vitals  taken for this visit.      Mental Status Exam   Alertness: alert  and oriented  Appearance: adequately groomed  Behavior/Demeanor: cooperative, pleasant and calm, with good eye contact   Speech: normal and regular rate and rhythm  Language: intact and no problems  Psychomotor: normal or unremarkable  Mood: Maybe slight improvement  Affect: full range and appropriate; was congruent to mood  Thought Process/Associations: unremarkable  Thought Content:  Reports passive SI without intent or plan;  Denies violent ideation and delusions  Perception:  Reports none;  Denies auditory hallucinations and visual hallucinations  Insight: intact  Judgment: intact  Cognition: does  appear grossly intact; formal cognitive testing was not done  Gait and Station: not observed     Data      No flowsheet data found.  PHQ-9 SCORE 10/4/2021   PHQ-9 Total Score MyChart 19 (Moderately severe depression)   PHQ-9 Total Score 19     JE-7 SCORE 10/4/2021   Total Score 19 (severe anxiety)   Total Score 19

## 2021-12-08 NOTE — TELEPHONE ENCOUNTER
On December 8, 2021, at 12:14 PM, writer called patient at mobile to confirm Virtual Visit. Writer unable to make contact with patient. Writer left detailed voice message for call back, with [phone] left as callback number. Link for [employer] was provided via: Send to preferred e-mail: [email or web address]. Nicole Waddell EMT

## 2021-12-08 NOTE — PATIENT INSTRUCTIONS
**For crisis resources, please see the information at the end of this document**   Patient Education    Thank you for coming to the [facility] [place] [facility].    Lab Testing:  If you had lab testing today and your results are reassuring or normal they will be mailed to you or sent through [facility] within 7 days. If the lab tests need quick action we will call you with the results. The phone number we will call with results is # [phone] (home) . If this is not the best number please call our clinic and change the number.    Medication Refills:  If you need any refills please call your pharmacy and they will contact us. Our fax number for refills is [phone]. Please allow three business for refill processing. If you need to  your refill at a new pharmacy, please contact the new pharmacy directly. The new pharmacy will help you get your medications transferred.     Scheduling:  If you have any concerns about today's visit or wish to schedule another appointment please call our office during normal business hours [phone] (8-5:00 M-F)    Contact Us:  Please call [phone] during business hours (8-5:00 M-F).  If after clinic hours, or on the weekend, please call  [phone].    Financial Assistance [phone]  [facility] Billing [phone]  Central Billing Office, [facility]: [phone]  [place] Billing [phone]  Medical Records [phone]  [place] Patient Bill of Rights [email or web address]       MENTAL HEALTH CRISIS NUMBERS:  For a medical emergency please call  911 or go to the nearest ER.     [facility]:   [facility] -[phone]   Crisis Residence [facility] -[phone]   Walk-In Counseling Center [facility] -[phone]   COPE 24/7 [place] Mobile Team -[phone] (adults)/[phone] (child)  CHILD: [facility] needs assessment team -  [phone]      The Medical Center:   [facility] - [phone]   Walk-in counseling [facility] - [phone]   Walk-in counseling [facility] - [phone]   Crisis Residence [facility] - [phone]  Urgent Care Adult Mental [facility]-[phone] mobile unit/ 24/7 crisis line    National Crisis Numbers:   National Suicide Prevention Lifeline: 3-852-849-TALK (141-898-6432)  Poison Control Center - 5-814-175-6298  [place]/resources for a list of additional resources (SOS)  Trans Lifeline a hotline for transgender people 7-034-751-8621  The [employer] Project a hotline for LGBT youth 4-954-301-8544  Crisis Text Line: For any crisis 24/7   To: 142843  see www.crisistextline.org  - IF MAKING A CALL FEELS TOO HARD, send a text!         Again thank you for choosing [facility] MENTAL HEALTH & ADDICTION [facility] and please let us know how we can best partner with you to improve you and your family's health.    You may be receiving a survey regarding this appointment. We would love to have your feedback, both positive and negative. The survey is done by an external company, so your answers are anonymous.

## 2021-12-13 ENCOUNTER — VIRTUAL VISIT (OUTPATIENT)
Dept: PSYCHIATRY | Facility: CLINIC | Age: 41
End: 2021-12-13
Attending: SOCIAL WORKER
Payer: COMMERCIAL

## 2021-12-13 DIAGNOSIS — F33.1 MAJOR DEPRESSIVE DISORDER, RECURRENT EPISODE, MODERATE (H): ICD-10-CM

## 2021-12-13 DIAGNOSIS — F41.1 GENERALIZED ANXIETY DISORDER: Primary | ICD-10-CM

## 2021-12-13 PROCEDURE — 90837 PSYTX W PT 60 MINUTES: CPT | Mod: GT | Performed by: SOCIAL WORKER

## 2021-12-13 ASSESSMENT — PATIENT HEALTH QUESTIONNAIRE - PHQ9
SUM OF ALL RESPONSES TO PHQ QUESTIONS 1-9: 17
10. IF YOU CHECKED OFF ANY PROBLEMS, HOW DIFFICULT HAVE THESE PROBLEMS MADE IT FOR YOU TO DO YOUR WORK, TAKE CARE OF THINGS AT HOME, OR GET ALONG WITH OTHER PEOPLE: SOMEWHAT DIFFICULT
SUM OF ALL RESPONSES TO PHQ QUESTIONS 1-9: 17

## 2021-12-13 ASSESSMENT — ANXIETY QUESTIONNAIRES
7. FEELING AFRAID AS IF SOMETHING AWFUL MIGHT HAPPEN: SEVERAL DAYS
GAD7 TOTAL SCORE: 13
2. NOT BEING ABLE TO STOP OR CONTROL WORRYING: MORE THAN HALF THE DAYS
6. BECOMING EASILY ANNOYED OR IRRITABLE: NEARLY EVERY DAY
GAD7 TOTAL SCORE: 13
5. BEING SO RESTLESS THAT IT IS HARD TO SIT STILL: SEVERAL DAYS
7. FEELING AFRAID AS IF SOMETHING AWFUL MIGHT HAPPEN: SEVERAL DAYS
3. WORRYING TOO MUCH ABOUT DIFFERENT THINGS: MORE THAN HALF THE DAYS
4. TROUBLE RELAXING: MORE THAN HALF THE DAYS
GAD7 TOTAL SCORE: 13
1. FEELING NERVOUS, ANXIOUS, OR ON EDGE: MORE THAN HALF THE DAYS

## 2021-12-14 ASSESSMENT — PATIENT HEALTH QUESTIONNAIRE - PHQ9: SUM OF ALL RESPONSES TO PHQ QUESTIONS 1-9: 17

## 2021-12-14 ASSESSMENT — ANXIETY QUESTIONNAIRES: GAD7 TOTAL SCORE: 13

## 2021-12-23 ENCOUNTER — TELEPHONE (OUTPATIENT)
Dept: PSYCHIATRY | Facility: CLINIC | Age: 41
End: 2021-12-23

## 2021-12-23 NOTE — PROGRESS NOTES
OUTPATIENT PSYCHOTHERAPY PROGRESS NOTE    Client Name: Augusto Lea   YOB: 1980 (41 year old)   Date of Service:  Dec 13, 2021  Time of Service: 4:00 pm to 5:00 pm (60 minutes)  Service Type(s):40124 psychotherapy (53-60 min. with patient and/or family)    Type of service: Telemedicine Psychotherapy  Reason for Telemedicine Visit: COVID-19 public health recommendations on in-person sessions  Mode of transmission: Secure real time interactive audio and visual telecommunication system (videoconference via [employer])  Location of originating and distant sites:    Originating site (patient location): patient home    Distant site (provider site): HIPAA compliant location within provider home/remote setting  Telemedicine Visit: The patient's condition can be safely assessed and treated via synchronous audio and visual telemedicine encounter.    Patient has agreed to receiving services via telemedicine technology.    Diagnoses:   F41.1   Generalized Anxiety Disorder  F33.1   Major Depressive Disorder, Recurrent, Moderate    Individuals Present:   Client attended alone    Treatment goal(s) being addressed:   -Decrease anxiety symptoms, including through use of mindfulness strategies  -Improve sleep  -Increase motivation in completing tasks  -Decrease alcohol intake    Subjective:  Augusto reports on-going symptoms of anxiety and depression with some minimal changes in symptoms. He completed a couple of thought logs as part of homework. He worked on utilizing some harm reduction strategies to manage alcohol use. Augusto reports seeing this as not an accomplishment as he drank when he had not planned to drink.    Treatment:   -Provided reflective listening as Augusto shared events of past week. Augusto reported some good days as his mom was in town. He was able to get some tasks accomplished with his home. Augusto also reports drinking several nights when he had not planned to/had not felt symptoms were as high and expressed  "frustration with himself. Writer noted this may be an example of all or nothing thinking that would be covered today. Near end of session, Augusto able to share thoughts about new appointment system. Appointment type noted it started at 3:45 (rather than \"arrival\" time) and he waited 15 minutes for appt with writer. He also expressed concern that he would need to do the PHQ-9 and other assessments at each visit. Writer empathized with experience, will share feedback with clinic and noted that assessments should not be given to him every week. Writer also reviewed that sessions will start at 4; he does not need to be present early.  -Reviewed thought logs and thoughts about homework. Offered positive feedback in work. Augusto reports some struggle with identifying thoughts or resulting emotions. Writer encouraged some gentleness and reviewed he is just starting to practice skill. Worked on reviewing logs together to identify potential thoughts.  -Began psychoeducation on common cognitive distortions. Utilized both generic examples and examples from Augusto's life to work on understanding of distortions. Augusto able to identify that he discussed with a previous therapist, but not with name cognitive distortions. He was open to discussion and able to identify when examples provided by writer appeared accurate and when they did not (ex: uses all or nothing thinking with work on his house but has been able to limit all or nothing thinking in his work life). Worked on connecting thought patterns to potential emotions.     Assessment and Progress:   Augusto continues to be engaged during therapy sessions and completed some homework. He appeared to experience some frustration over limited progress and symptoms appear to cause significant distress in daily life.     Mental Status Exam    Alertness: alert  and oriented  Appearance: well groomed  Behavior/Demeanor: cooperative and pleasant, with good  eye contact   Speech: normal and " regular rate and rhythm  Language: intact  Psychomotor: normal or unremarkable  Mood: depressed and anxious  Affect: full range; was congruent to mood; was congruent to content  Thought Process/Associations: unremarkable  Thought Content:  Reports none;  Denies none  Perception:  Reports none;  Denies none  Insight: fair  Judgment: fair  Cognition: (6) does  appear grossly intact; formal cognitive testing was not done  Gait and Station: unable to assess due to video visit    Plan:   Homework of continuing to work on thought logs was assigned. Next therapy appointment has been scheduled for 12/20/2021 to continue work on treatment goals.      Treatment Plan review due: 2/07/2022      KRISHNA Vicente (Patty), [facility]    Direct Phone: [phone]  Fax: [phone]    [facility]  [facility], 2nd floor  [address], [address]  [place] [address]      Answers for HPI/ROS submitted by the patient on 12/13/2021  If you checked off any problems, how difficult have these problems made it for you to do your work, take care of things at home, or get along with other people?: Somewhat difficult  PHQ9 TOTAL SCORE: 17  JE 7 TOTAL SCORE: 13

## 2021-12-23 NOTE — TELEPHONE ENCOUNTER
SW left voicemail for Augusto. Writer provided update that writer it out of the office and unable to meet on 12/27 as requested (writer also sent My Chart with same message earlier in the week but it has not yet been read). SW provided contact number and encouraged patient to call today, 12/23, if he wanted to check in or needed support, otherwise writer will plan on meeting with him on 1/3/22.    KRISHNA Vicente, [facility]  [phone]

## 2022-01-03 ENCOUNTER — TELEPHONE (OUTPATIENT)
Dept: PSYCHIATRY | Facility: CLINIC | Age: 42
End: 2022-01-03

## 2022-01-03 NOTE — TELEPHONE ENCOUNTER
"SW called patient to follow up on status of next therapy session. Patient reports he is stressed over trying to get caught up from time off of work. Time made for 1/5/2021 at 4:00 pm.    Augusto reports the last few weeks have been \"rough.\" He reports difficulty managing energy level. He feels he has to force himself to get out of bed in the mornings and notes that at 2:00 pm, he is just starting to feel like he is ready to perform tasks at typical level. He notes change happened after sertraline increase, so he returned to taking 1 tab. He did note that he has been able to sleep really well. He is not experiencing feelings of impending doom before bed, is not waking up with anxiety before alarm in morning. He continues to have racing thoughts before bed, but reports they are not anxious in nature. He reports lessened anxiety symptoms and depression is getting better. writer will provide update to Dr. Grady and request recommendations regarding meds.    Rose Marie Enriquez, KRISHNA, [facility]  [phone]    "

## 2022-01-05 ENCOUNTER — VIRTUAL VISIT (OUTPATIENT)
Dept: PSYCHIATRY | Facility: CLINIC | Age: 42
End: 2022-01-05
Attending: SOCIAL WORKER
Payer: COMMERCIAL

## 2022-01-05 DIAGNOSIS — F41.1 GENERALIZED ANXIETY DISORDER: Primary | ICD-10-CM

## 2022-01-05 DIAGNOSIS — F33.1 MAJOR DEPRESSIVE DISORDER, RECURRENT EPISODE, MODERATE (H): ICD-10-CM

## 2022-01-05 PROCEDURE — 90837 PSYTX W PT 60 MINUTES: CPT | Mod: GT | Performed by: SOCIAL WORKER

## 2022-01-12 NOTE — PROGRESS NOTES
OUTPATIENT PSYCHOTHERAPY PROGRESS NOTE    Client Name: Augusto Lea   YOB: 1980 (41 year old)   Date of Service:  Jan 5, 2022  Time of Service: 4:01 pm to 5:01 pm (60 minutes)  Service Type(s):64262 psychotherapy (53-60 min. with patient and/or family)    Type of service: Telemedicine Psychotherapy  Reason for Telemedicine Visit: COVID-19 public health recommendations on in-person sessions  Mode of transmission: Secure real time interactive audio and visual telecommunication system (videoconference via [employer])  Location of originating and distant sites:    Originating site (patient location): patient home    Distant site (provider site): HIPAA compliant location within provider home/remote setting  Telemedicine Visit: The patient's condition can be safely assessed and treated via synchronous audio and visual telemedicine encounter.    Patient has agreed to receiving services via telemedicine technology.    Diagnoses:   F41.1   Generalized Anxiety Disorder  F33.1   Major Depressive Disorder, Recurrent, Moderate    Individuals Present:   Client attended alone    Treatment goal(s) being addressed:   -Decrease anxiety symptoms, including through use of mindfulness strategies  -Improve sleep  -Increase motivation in completing tasks  -Decrease alcohol intake    Subjective:  Augusto reported spending time with family over Christmas. Per recent conversation on phone, anxiety and depression symptoms have decreased with increase in medication. However, Augusto also reported feeling very tired and difficulty with motivation, which significantly impacted ability to complete tasks. Alcohol use has been similar to past months. He has not tried medication to curb cravings but may try it soon. He did not formally (ie in writing) complete homework, but was able to identify several examples of distorted thoughts.    Treatment:   -Reviewed homework. Augusto able to identify that family has different thought process in  dealing with Covid. He was able to identify distortion and reflect that family was not purposely trying to make him mad by not using safety protocols. Reflected on how this change in thought may have helped him manage mood during holidays.  -Provided additional psychoeducation on thought distortions, including mind reading and fortune telling. Worked on identifying potential distortions in Augusto's life. Augusto was able to come up with some examples. He has previously worked on these distortions in past therapy, without naming them as mind reading.   -Provided reflective listening, validation while Augusto shared concerns about seeing friends and weight gain. Augusto has not worked out due to Covid safety measures and depression symptoms, leading to weight gain. He has a strong desire to spend time with some life long friends, but is concerned that they will say something about his weight. Used socratic questioning to further explore thoughts and emotions related to concern.    Assessment and Progress:   Augusto is reporting some relief from symptoms, primary due to medications. He was able to independently identify some distorted thoughts over the past several weeks. Augusto reports on-going struggles and questions about having a potential addictive personality and would like to discuss more at next session.    Mental Status Exam    Alertness: alert  and oriented  Appearance: well groomed  Behavior/Demeanor: cooperative and pleasant, with good  eye contact   Speech: normal and regular rate and rhythm  Language: no problems  Psychomotor: no noted concerns  Mood: depressed and anxious  Affect: full range; was congruent to mood; was congruent to content  Thought Process/Associations: unremarkable  Thought Content:  Reports none;  Denies none  Perception:  Reports none;  Denies none  Insight: good  Judgment: good  Cognition: (6) does  appear grossly intact; formal cognitive testing was not done  Gait and Station: unable to assess due  to video visit    Plan:   Continue to identify thought distortions. Next therapy appointment has been scheduled for 1/11/22 to continue work on treatment goals.      Treatment Plan review due: 2/07/2022      KRISHNA Vicente (Patty), [facility]    Direct Phone: [phone]  Fax: [phone]    [facility]  [facility], 2nd floor  [address], [address]  [place] [id number]

## 2022-01-18 ENCOUNTER — VIRTUAL VISIT (OUTPATIENT)
Dept: PSYCHIATRY | Facility: CLINIC | Age: 42
End: 2022-01-18
Attending: SOCIAL WORKER
Payer: COMMERCIAL

## 2022-01-18 DIAGNOSIS — F41.1 GENERALIZED ANXIETY DISORDER: Primary | ICD-10-CM

## 2022-01-18 DIAGNOSIS — F33.1 MAJOR DEPRESSIVE DISORDER, RECURRENT EPISODE, MODERATE (H): ICD-10-CM

## 2022-01-18 PROCEDURE — 90837 PSYTX W PT 60 MINUTES: CPT | Mod: GT | Performed by: SOCIAL WORKER

## 2022-01-25 ENCOUNTER — VIRTUAL VISIT (OUTPATIENT)
Dept: PSYCHIATRY | Facility: CLINIC | Age: 42
End: 2022-01-25
Attending: SOCIAL WORKER
Payer: COMMERCIAL

## 2022-01-25 DIAGNOSIS — F33.1 MAJOR DEPRESSIVE DISORDER, RECURRENT EPISODE, MODERATE (H): Primary | ICD-10-CM

## 2022-01-25 DIAGNOSIS — F41.1 GENERALIZED ANXIETY DISORDER: ICD-10-CM

## 2022-01-25 PROCEDURE — 90837 PSYTX W PT 60 MINUTES: CPT | Mod: GT | Performed by: SOCIAL WORKER

## 2022-01-27 DIAGNOSIS — F99 INSOMNIA DUE TO OTHER MENTAL DISORDER: ICD-10-CM

## 2022-01-27 DIAGNOSIS — F41.1 GENERALIZED ANXIETY DISORDER: ICD-10-CM

## 2022-01-27 DIAGNOSIS — F51.05 INSOMNIA DUE TO OTHER MENTAL DISORDER: ICD-10-CM

## 2022-01-28 RX ORDER — ALPRAZOLAM 0.5 MG
0.5 TABLET ORAL
Qty: 15 TABLET | Refills: 0 | Status: SHIPPED | OUTPATIENT
Start: 2022-01-28 | End: 2022-03-31

## 2022-01-28 NOTE — TELEPHONE ENCOUNTER
Most recent appointment: 12/8/21  Follow up: 2/3/22    Per [place] , 15 tabs of alprazolam were dispensed on 12/16/21 and 11/17/21.    Med tab indicates a start date of 12/8/21 and end date of 6/6/22 for alprazolam.     (Unrelated - gabapentin Rx written on 12/8/21 does not appear to have been filled.)    Routed pended Rx to Dr. Grady for review and signature if appropriate to refill.

## 2022-01-31 NOTE — PROGRESS NOTES
OUTPATIENT PSYCHOTHERAPY PROGRESS NOTE    Client Name: Augusto Lea   YOB: 1980 (41 year old)   Date of Service:  Jan 18, 2022  Time of Service: 4:02 pm to 5:02 pm (60 minutes)  Service Type(s):58438 psychotherapy (53-60 min. with patient and/or family)    Type of service: Telemedicine Psychotherapy  Reason for Telemedicine Visit: COVID-19 public health recommendations on in-person sessions  Mode of transmission: Secure real time interactive audio and visual telecommunication system (videoconference via [employer])  Location of originating and distant sites:    Originating site (patient location): patient home    Distant site (provider site): HIPAA compliant location within provider home/remote setting  Telemedicine Visit: The patient's condition can be safely assessed and treated via synchronous audio and visual telemedicine encounter.    Patient has agreed to receiving services via telemedicine technology.    Diagnoses:   F41.1   Generalized Anxiety Disorder  F33.1   Major Depressive Disorder, Recurrent, Moderate    Individuals Present:   Client attended alone    Treatment goal(s) being addressed:   -Decrease anxiety symptoms, including through use of mindfulness strategies  -Improve sleep  -Increase motivation in completing tasks  -Decrease alcohol intake    Subjective:  Augusto reports variable changes in mood and symptoms. He has started increasing his movement, which appears to be helping sleep. He is continuing to regularly use alcohol. Appears to be experiencing on-going depressive thoughts.    Treatment:   -Provided reflective listening and validation as Augusto discussed some recent changes. Augusto has started walking 1-2 times per day. He does not identify this as exercise, noting he would use the word exercise for weight-lifting or other exertion related exercises. The increased movement appears to be helping with sleep at night and he is experiencing less fatigue during the day. Writer reflected  on the high expectations he sets for himself.  -Used Socratic questioning to challenge distorted thoughts. Augusto voiced concern about progress, feels like he may be taking away spot from another person. When writer worked on uncovering Stuck Points, including feelings of unworthiness, Augusto requested to change the subject. Augusto is able to identify a thought as a potential distortion but may not always be able to relate why it is a distortion or formulate alternative thoughts.  -Identified alternative strategies to manage drinking. Augusto noted that he has been drinking light beer and this has led to some decrease in effects of drinking. Writer suggested additional alternatives of non-alcoholic beer or mocktails. Augusto has been thinking about this and is willing to give it a try.  -Psychoeducation on potential symptoms. Augusto shared examples of past activities he has enjoyed, including building an intricate hunting stand. He shares that he gets very involved/intense in a hobby for a period of time, then loses interest and moves on to other activities. He gave several examples of hobbies. writer provided psycho education that this could potentially be a symptom/sign of ADHD with a hyper focus on activities.      Assessment and Progress:   Augusto has made some life changes, most notably increasing physical activity and working to decrease alcohol use. Along with medications, these changes could help lead to decreased symptoms. Augusto continues to work through distorted thoughts. He expressed an understanding when he has distorted thinking, we will continue to work on formulating and believing alternative thoughts.    Mental Status Exam    Alertness: alert  and oriented  Appearance: well groomed  Behavior/Demeanor: cooperative, with good  eye contact   Speech: normal and regular rate and rhythm  Language: no problems  Psychomotor: normal or unremarkable  Mood: depressed and anxious  Affect: full range; was congruent to mood; was  congruent to content  Thought Process/Associations: unremarkable  Thought Content:  Reports none;  Denies none  Perception:  Reports none;  Denies none  Insight: good  Judgment: good  Cognition: (6) does  appear grossly intact; formal cognitive testing was not done  Gait and Station: unable to assess due to video visit      Plan:   Writer will email C-sam videos on urge surfing (to manage alcohol cravings). Writer will also review number of sessions to discuss potential next steps at next therapy session. Next therapy appointment has been scheduled for 1/25/22 to continue work on treatment goals.      Treatment Plan review due: 2/07/2022      KRISHNA Vicente (Patty), [facility]    Direct Phone: [phone]  Fax: [phone]    [facility]  [facility], 2nd floor  [address], [address]  [place] [address]

## 2022-02-03 ENCOUNTER — VIRTUAL VISIT (OUTPATIENT)
Dept: PSYCHIATRY | Facility: CLINIC | Age: 42
End: 2022-02-03
Attending: PSYCHIATRY & NEUROLOGY
Payer: COMMERCIAL

## 2022-02-03 DIAGNOSIS — F33.0 MILD EPISODE OF RECURRENT MAJOR DEPRESSIVE DISORDER (H): Primary | ICD-10-CM

## 2022-02-03 DIAGNOSIS — F10.20 UNCOMPLICATED ALCOHOL DEPENDENCE (H): ICD-10-CM

## 2022-02-03 DIAGNOSIS — F99 INSOMNIA DUE TO OTHER MENTAL DISORDER: ICD-10-CM

## 2022-02-03 DIAGNOSIS — F41.1 GENERALIZED ANXIETY DISORDER: ICD-10-CM

## 2022-02-03 DIAGNOSIS — F51.05 INSOMNIA DUE TO OTHER MENTAL DISORDER: ICD-10-CM

## 2022-02-03 PROCEDURE — 99215 OFFICE O/P EST HI 40 MIN: CPT | Mod: GT | Performed by: PSYCHIATRY & NEUROLOGY

## 2022-02-03 RX ORDER — SERTRALINE HYDROCHLORIDE 100 MG/1
150 TABLET, FILM COATED ORAL DAILY
Qty: 45 TABLET | Refills: 1 | Status: SHIPPED | OUTPATIENT
Start: 2022-02-03 | End: 2022-03-31

## 2022-02-03 RX ORDER — GABAPENTIN 300 MG/1
300-1200 CAPSULE ORAL
Qty: 120 CAPSULE | Refills: 1 | Status: SHIPPED | OUTPATIENT
Start: 2022-02-03 | End: 2022-03-31

## 2022-02-03 RX ORDER — NALTREXONE HYDROCHLORIDE 50 MG/1
50 TABLET, FILM COATED ORAL DAILY
Qty: 30 TABLET | Refills: 1 | Status: SHIPPED | OUTPATIENT
Start: 2022-02-03 | End: 2022-03-31

## 2022-02-03 ASSESSMENT — PAIN SCALES - GENERAL: PAINLEVEL: NO PAIN (0)

## 2022-02-03 NOTE — PROGRESS NOTES
"VIDEO VISIT  Augusto Lea is a 41 year old patient that has consented to receive services via billable video visit.      The patient has been notified of following:   \"This video visit will be conducted via a call between you and your physician/provider. We have found that certain health care needs can be provided without the need for an in-person physical exam. This service lets us provide the care you need with a video conversation. If a prescription is necessary we can send it directly to your pharmacy. If lab work is needed we can place an order for that and you can then stop by our lab to have the test done at a later time. Insurers are generally covering virtual visits as they would in-office visits so billing should not be different than normal.  If for some reason you do get billed incorrectly, you should contact the billing office to correct it and that number is in the AVS .    Patient will join video visit via:  [employer] (Patient / guardian confirmed to join via [employer])    If patient attempts to join the video via [employer] at appointment start time, but is unable to, they would prefer that the provider send them a video invitation via:   Send to preferred e-mail: [email or web address]      How would patient like to obtain AVS?:  [employer]    Telehealth Details  Type of service:  video visit for consult  Date of service: Feb 3, 2022  Time of service:    Start Time:  2:04 PM    End Time:  2:38 PM    Reason for video visit:  Services only offered telehealth  Originating Site (patient location):  Patient's home  Distant Site (provider location):  [facility] PSYCH[facility]  Mode of Communication:  Video conference via [facility]  Rapid Access Brief Treatment Program  MEDICAL PROGRESS NOTE #4     Augusto Lea is a 41 year old with history of anxiety.     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 Depression is a newer issue, having started more gradually over the last few years, and is closely associated with the anxiety. Also notes a pattern of finding alcohol more helpful than he would prefer in helping to reduce anxiety and insomnia. Actively tries to limit use, but does tend to rely on it when things get more severe.   We did discuss his history with attentional symptoms. I do think it would be worth having him explore ADHD evaluation. We did discuss that even with this, stimulant prescribing can be complicated, but the assessment may be helpful with targeting symptoms and figuring out the interplay between attentional symptoms and anxiety.   - Pros: Can be helpful for discerning specific strengths / weaknesses, and specific cognitive areas to focus interventions. Also may help parsing mood symptoms from primary attentional symptoms, and may even result in a diagnosis of ADHD, depending on many factors.   - Cons: May not result in a diagnosis, and regardless, this does not address the other factors that complicate stimulant prescribing.   Many times the recommendation is to address mood symptoms to be able to better assess attentional needs.   Psychotherapy discussion: Primary recommendation for the treatment of mood symptoms, especially anxiety. Supportive therapy can be useful for reducing the impact of acute or chronic psychosocial stressors. CBT can be particularly helpful for ruminative thinking and addressing maladaptive coping mechanisms that may worsen anxiety.   Medication discussion:   Primary  "mood support medications:   Augusto has noted recent improvement in symptoms, may be that sertraline was part of this. He did note possibly having some slightly improved motivation and less brain fog, so this may be a good sign. Given that he has tolerated it well, and that things are at least headed in the right direction, I recommended increasing to 200mg at this time.   Short acting anxiolytic medications:   Augusto does depend frequently on short acting anxiolytic substances (alcohol and alprazolam) to reduce anxiety and insomnia symptoms. We discussed in detail the complex interaction between anxiety and substances, and how short acting substances do perpetuate anxiety and usually worsen it over time, prevent it from improving.   I am okay with continuing the alprazolam for the moment to minimize simultaneous medication changes, but Augusto is aware that it is the most potent short acting anxiolytic that exists, and that it is therefore also the most reinforcing to anxiety.   The goal should be to not be taking alprazolam regularly at all. If he can achieve the goal of needing it less than once per month, this would be ideal. I would leave it to his long term provider's discretion whether he could keep 5-10 tablets around for emergencies / \"backup\".   We did also talk about alternatives at this time that may be helpful and I did provide a ranged dosing of gabapentin for Augusto to try to see how this might help with nighttime symptoms. Fortunately, he has found this to be beneficial for sleep, and avoids it when drinking.   Given the issues with falling asleep and getting up in the morning, there may be some delayed phase circadian rhythm shift. Melatonin is often used for this, but unfortunately caused Augusto to feel more restless and caused strange dreams. Light box therapy can also be helpful for circadian shifts.   Antiadrenergic medications:   Augusto has evidence of autonomic hyperarousal (elevated fight-or-flight), with " high baseline anxiety including physical symptoms and occasional panic attacks. Clonidine was not helpful for symptoms and actually seemed to interfere with sleep. Guanfacine may be another option to consider, especially given his concerns about ADHD.   Alcohol dependence:   Augusto does note that he relies on alcohol more often than he would like to, and did like the idea of naltrexone and how it can reduce the drive to drink and diminish the strength of the euphoria generated by drinking. He stopped it during recent bout of fatigue, but agreed to try it again at this time.   He is interested in trying this, and will give it a shot at this time. We did discuss that the Vivitrol IM form of naltrexone may also be worth considering if the PO form is partially but not completely effective.     [place] PRESCRIPTION MONITORING PROGRAM [] was checked today: indicates taking controlled substances as prescribed    PSYCHOTROPIC DRUG INTERACTIONS: None   MANAGEMENT:  N/A     Plan     1) PSYCHOTROPIC MEDICATION RECOMMENDATIONS:  - Continue sertraline 150mg daily  - Recommend restarting naltrexone 50mg daily  - May take gabapentin 300-1200mg at bedtime as needed for anxiety / sleep  - May take alprazolam 0.5mg at bedtime as needed for anxiety    [see the following SmartPhrase(s) for more information: PSYMEDINFOSERTRALINE - PSYMEDINFOBENZOS]    2) THERAPY: Psychotherapy is a primary recommendation.   Currently engaged in individual therapy with Jennifer Enriquez through [facility].    Will be seeking long term therapy support.     3) NEXT DUE:   Labs- Routine monitoring is not indicated for current psychotropic medication regimen   ECG- Routine monitoring is not indicated for current psychotropic medication regimen   Rating Scales- N/A    4) REFERRALS / COORDINATION: Placed referral for ADHD evaluation 12/8/21.     5) DISPOSITION: Will likely plan to transfer care to primary care provider after next appointment.     Treatment  "Risk Statement:  The patient understands the risks, benefits, adverse effects and alternatives. Agrees to treatment with the capacity to do so. No medical contraindications to treatment. Agrees to contact provider for any problems. The patient understands to call 911 or go to the nearest ED if urgent or life threatening symptoms occur. Crisis contact numbers are provided routinely in the After Visit Summary.       PROVIDER:  Dionisio Grady MD    40 min spent on the date of the encounter in chart review, patient visit, review of tests, documentation, care coordination, and/or discussion with other providers about the issues documented above.     Pertinent Background   Per recent DA on 10/4/21:   Depression-  Reports symptoms for the past 2 years of feeling down, depressed, and hopeless, as well as lack of motivation, anhedonia (described it as a \"constant state of boredom\"), insomnia, fatigue, difficulty concentrating/making decisions, fidgety/restlessness, and intermittent decreased appetite. He used to have many hobbies including hunting, carpentry, golfing, and playing video games, but \"none of it brings me pleasure anymore.\" He states that \"I don't remember what happy is, content is the best I can get to.\" He reports being a  2+ years ago, which was the last memory he has of being \"happy.\"  Anxiety- Patient reports experiencing \"test anxiety\" as a child, but his anxiety notably worsened about 3-4 years ago. He states he does not \"do the things I used to do, sometimes I can't do anything.\" He notes he takes approximately 1-2 mental health days per month, and received reasonable accommodation to work from home due to his anxiety. He also endorses insomnia, racing thoughts, fidgeting/pacing/restlessness, and difficulty concentrating.  OCD- Patient reports intrusive thoughts that began about 10-12 years ago of death, dying, and \"impending doom\" such as someone breaking into his house. He reports he " intermittently tries to suppress these thoughts, particularly at bedtime. He reports compulsive behaviors such as double checking locks about 2x, counting stairs, and being obsessed with even numbers. He reports he does not feel like the compulsions take up too much of his time, but the intrusive thoughts interfere with his social life and ability to concentrate. He states he has been prescribed medication to manage his intrusive thoughts, but has had no relief.       Interval History    Augusto notes that he has been feeling pretty good in the last week or so. The fatigue was really severe for a while, but appears to be improving. Not sure what was causing that.   He has had more energy, been more active, trying to stick with more physical activity, walking probably 3-4 miles per day.   Anxiety is better. Has not been having anxious thoughts before bed and waking up with anxious thoughts that keep him awake. When he does wake up at night can fall back asleep pretty easily.   Still does have instances where things get a little rough, but much fewer than before.   Aside from the sertraline, hard to identify if there are any factors that may have contributed to improvement.   Still drinking, but not hard alcohol anymore. Has switched to 4.2% beer, might have a few per night, which is next to nothing compared to before. He actually beckwith it down to make it last longer.   He only drinks when he plays video games, and it is a force of habit, a strong association that he has.   Has a new routine after work where he goes for a walk, then does work around the house, then goes for a drive often, to get familiar with the area, as he never really learned the area after moving here.   Does note how before he was kind of drinking to deal with his pain, as well as high anxiety. And the pain has improved significantly.   Only tried the naltrexone for a little while.   After he was feeling fatigued, decreased sertraline back to  100mg, then back up to 150mg. Still taking that dose.   Hasn't really needed the alprazolam as much. Never takes it when drinking.   Gabapentin does seem to help with sleep, usually takes 2-3 for sleep. At first was pretty groggy in the morning, but that part has improved.   Has noticed less appetite recently. Will monitor this, as he starts lifting weights again.   Still hasn't arranged the ADHD testing, but is planning to do that as well.        Discussion points from past appointments:   Augusto has been talking with his family more about mental health now that he is getting treatment, and finding out more about his father having lifelong anxiety. He also spoke with his mom about ADHD. Notes that he was never physically hyper in childhood, but does think that he had a lot of trouble with attention, mind racing, things like that, going back to childhood. He did have issues in school, but never told his mom about this. Doesn't think they would have taken him in anyway as this wasn't the kind of thing that they did in the family.   Did reduce the alcohol use the first week after we met, but has had difficulty maintaining. Is motivated to cut back but has cravings. Does drink to help mitigate symptoms. Tries not to drink every day. Has at one point gone through 1.75L vodka in 1 week. He is ambivalent about substance use. Does note it may not be ideal, but is not sure about whether he is ready to make changes for this. Alcohol does help with anxiety and relaxation. Helps him to enjoy things more. Prefers video games to TV, enjoys being more engaged, enjoys the social element, and alcohol does help to enjoy things more.   Overall Augusto has not really been able to figure out the trigger for things getting worse over time. He used to enjoy golfing and doing other things, but just doesn't feel that most of the time now. Has a few good days, but they are rare.   Has episodes where anxiety peaks ~few times per week, where he  has to pace and calm himself down. The severe panic attacks happen a few times a year, where he has severe symptoms and it feels like he might die. Things have been worse since COVID. Gets significant issues with irritability. Has punched the wall at times, did hurt / possibly break his hand at one point.   Sleep is very poor. Stays up until 12:30-1 every night because he can't sleep unless he is totally exhausted. Then it is hard to get up in the mornings as well.   Social anxiety is a newer issue that did not seem to be as present before. Moved to [place] a [place] years ago for work. 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     Pertinent Social Hx:  FINANCIAL SUPPORT- Working as a  for the "[employer]" for about 10 years. He currently works from home after receiving reasonable accommodation due to his anxiety.   LIVING SITUATION / RELATIONSHIPS- Currently living alone, moved into his house about a year ago. He reports moving here from [place], [place] about 10 years ago for work, it has been a difficult transition.   SOCIAL/ SPIRITUAL SUPPORT- Has family in [place]. Hasn't made a lot of connections in [place].     Pertinent Substance Use  Alcohol- At peak, went through about 1.75L bottle of [place] in about a week. Has reduced use since that time. Sets rules for himself around use. It does help quite a bit with anxiety which he feels is unfortunate, and does help him to sleep through the night as well.   Nicotine- Used to chew for 10 years, gave up in 2017, but it is hard, helped with anxiety and still craves at times.   Caffeine- 1 cup of coffee some days, mostly none though.   Opioids- None  Narcan Kit- N/A  THC/CBD- Has tried edibles to see about helping with anxiety, but didn't really do much.   Other Illicit Drugs- none     Medical Review of Systems    Dizziness/orthostasis- None  Headaches- None  GI-  Chronic low left abdomen pain for the past 3-4 years, which worsens anxiety and depression symptoms. He states he has been seeing doctors for this chronic pain and they have ruled out cancer.      Past Psychiatric History - Summary points of current care   10/2021 - Started sertraline. Switched from propranolol to clonidine.   11/2021 - Increased sertraline. Started gabapentin.   12/2021 - Increased to sertraline 150mg. Started naltrexone.      Psychotropic Medication Trials      Medication Max Dose (mg) Dates / Duration Helpful? DC Reason / Adverse Effects?   Fluoxetine? 20? 2011     Lexapro  ~2008-9, 2017  Possible lower energy   sertraline 150 10/2021 yes    Paxil    Severe nausea and severe anxiety   alprazolam 0.5  yes    Propranolol  20  no    clonidine 0.1 10/2021 ? Initial odd dreams / restlessness, BP improved   melatonin   no Was groggy, but restless sleep   naltrexone 50 12/2021        Past Medical History      Neurologic Hx [head injury, seizures, etc.]: None  No past medical history on file.     Medications      Current Outpatient Medications   Medication Sig Dispense Refill     ALPRAZolam (XANAX) 0.5 MG tablet Take 1 tablet (0.5 mg) by mouth nightly as needed for anxiety 15 tablet 0     gabapentin (NEURONTIN) 300 MG capsule Take 1-4 capsules (300-1,200 mg) by mouth nightly as needed (for anxiety / sleep) 120 capsule 1     hyoscyamine (LEVSIN/SL) 0.125 MG sublingual tablet Place 0.125 mg under the tongue 3 times daily as needed       naltrexone (DEPADE/REVIA) 50 MG tablet Take 1 tablet (50 mg) by mouth daily 30 tablet 1     sertraline (ZOLOFT) 100 MG tablet Take 2 tablets (200 mg) by mouth daily 60 tablet 1      Physical Exam  (Vitals Only)   There were no vitals taken for this visit.      Mental Status Exam   Alertness: alert  and oriented  Appearance: adequately groomed  Behavior/Demeanor: cooperative, pleasant and calm, with good eye contact   Speech: normal and regular rate and rhythm  Language:  intact and no problems  Psychomotor: normal or unremarkable  Mood: Maybe slight improvement  Affect: full range and appropriate; was congruent to mood  Thought Process/Associations: unremarkable  Thought Content:  Reports passive SI without intent or plan;  Denies violent ideation and delusions  Perception:  Reports none;  Denies auditory hallucinations and visual hallucinations  Insight: intact  Judgment: intact  Cognition: does  appear grossly intact; formal cognitive testing was not done  Gait and Station: not observed     Data      No flowsheet data found.  PHQ-9 SCORE 10/4/2021 12/13/2021   PHQ-9 Total Score MyChart 19 (Moderately severe depression) 17 (Moderately severe depression)   PHQ-9 Total Score 19 17     JE-7 SCORE 10/4/2021 12/13/2021   Total Score 19 (severe anxiety) 13 (moderate anxiety)   Total Score 19 13

## 2022-02-03 NOTE — PATIENT INSTRUCTIONS
**For crisis resources, please see the information at the end of this document**   Patient Education    Thank you for coming to the [facility].    Lab Testing:  If you had lab testing today and your results are reassuring or normal they will be mailed to you or sent through [employer] within 7 days. If the lab tests need quick action we will call you with the results. The phone number we will call with results is # [phone] (home) . If this is not the best number please call our clinic and change the number.    Medication Refills:  If you need any refills please call your pharmacy and they will contact us. Our fax number for refills is [phone]. Please allow three business for refill processing. If you need to  your refill at a new pharmacy, please contact the new pharmacy directly. The new pharmacy will help you get your medications transferred.     Scheduling:  If you have any concerns about today's visit or wish to schedule another appointment please call our office during normal business hours [phone] (8-5:00 M-F)    Contact Us:  Please call [phone] during business hours (8-5:00 M-F).  If after clinic hours, or on the weekend, please call  [phone].    Financial Assistance [phone]  [facility] Billing [phone]  Central Billing Office, [facility]: [phone]  [facility] Billing [phone]  Medical Records [phone]  [facility] Patient Bill of Rights [email or web address]       MENTAL HEALTH CRISIS NUMBERS:  For a medical emergency please call  911 or go to the nearest ER.     [facility]:   [facility] -[phone]   Crisis Residence [facility] Residence -[phone]   Walk-In Counseling Center [facility] -[phone]   COPE 24/7 [facility] Mobile Team -[phone] (adults)/[phone] (child)  CHILD: [facility] needs assessment team -  [phone]      [facility]:   [facility] - [phone]   Walk-in counseling [facility] - [phone]   Walk-in counseling [facility] - [phone]   Crisis Residence [facility] - [phone]  Urgent Care Adult Mental [facility]-[phone] mobile unit/ 24/7 crisis line    National Crisis Numbers:   National Suicide Prevention Lifeline: 2-987-683-TALK (844-264-5456)  Poison Control Center - 0-100-517-9090  Combat Medical/resources for a list of additional resources (SOS)  Trans Lifeline a hotline for transgender people 3-977-808-8305  The [employer] a hotline for LGBT youth 7-105-704-7496  Crisis Text Line: For any crisis 24/7   To: 004262  see www.crisistextline.org  - IF MAKING A CALL FEELS TOO HARD, send a text!         Again thank you for choosing [facility] MENTAL HEALTH & ADDICTION [facility] and please let us know how we can best partner with you to improve you and your family's health.    You may be receiving a survey regarding this appointment. We would love to have your feedback, both positive and negative. The survey is done by an external company, so your answers are anonymous.

## 2022-02-03 NOTE — PROGRESS NOTES
OUTPATIENT PSYCHOTHERAPY PROGRESS NOTE    Client Name: Augusto Lea   YOB: 1980 (41 year old)   Date of Service:  Jan 25, 2022  Time of Service: 4:01 pm to 5:01 pm (60 minutes)  Service Type(s):62368 psychotherapy (53-60 min. with patient and/or family)    Type of service: Telemedicine Psychotherapy  Reason for Telemedicine Visit: COVID-19 public health recommendations on in-person sessions  Mode of transmission: Secure real time interactive audio and visual telecommunication system (videoconference via M[employer])  Location of originating and distant sites:    Originating site (patient location): patient home    Distant site (provider site): HIPAA compliant location within provider home/remote setting  Telemedicine Visit: The patient's condition can be safely assessed and treated via synchronous audio and visual telemedicine encounter.    Patient has agreed to receiving services via telemedicine technology.    Diagnoses:   F41.1   Generalized Anxiety Disorder  F33.1   Major Depressive Disorder, Recurrent, Moderate    Individuals Present:   Client attended alone    Treatment goal(s) being addressed:   -Decrease anxiety symptoms, including through use of mindfulness strategies  -Improve sleep  -Increase motivation in completing tasks  -Decrease alcohol intake    Subjective:  Augusto reports experiencing 4 good days this week. He did not engage in alcohol use during this time and has been walking daily. He was unable to review the urge surfing meditations writer sent.    Treatment:   -Provided reflective listening and validation as Augusto shared events of week. Augusto was able to walk daily, even with the very cold weather. This has somewhat impacted his sleep. Although he has had several good days, he expressed some concern about experiencing a day with increased symptoms/cravings. Provided positive feedback for working towards his goals.  -Provided psychoeducation on the DISARM method from [employer].  "Discussed reasoning behind naming the urge. Augusto was able to identify that his urge tells him \"you might feel good now, but you'll feel better if you drink.\" Augusto did not feel comfortable giving urge a name, worked on exploring ways to identify the urge. Discussed developing awareness of early signs of the urge. Did not discuss refusing to give in to the urge as Augusto expressed some ambivalence related to drinking.  -Utilized motivational interviewing strategies to explore reasons to decrease/stop drinking. Augusto initially noted that he does not want to completely stop drinking. He would prefer to be able to keep it to the weekends. Writer wondered about reasons he previously identified to decrease/stop drinking. He was able to note that it has had a negative impact on his health. He feels it has been a way to connect with other people as he often has a drink in hand when connecting with friends via video games. Augusto has continued to use harm reduction strategies as he is now watering down his light beer. It does not taste as good but allows him to engage in activity of having a cup in his hand. Augusto also notes that he has been shifting his nighttime routine so that he does not immediately go to playing video games. Along with a walk, he has also been driving around his neighborhood to explore the area.     Assessment and Progress:   Augusto has made additional progress in decreasing amount of alcohol consumed via harm reduction strategies. He is demonstrating some ambivalence related to making changes. He has also made changes in daily routine, which appears to be somewhat helping his sleep schedule. It appears that symptoms of anxiety and depression continue to be present.    Mental Status Exam    Alertness: alert  and oriented  Appearance: well groomed  Behavior/Demeanor: cooperative, with good  eye contact   Speech: normal and regular rate and rhythm  Language: no problems  Psychomotor: no noted concerns  Mood: " depressed and anxious  Affect: full range; was congruent to mood; was congruent to content  Thought Process/Associations: unremarkable  Thought Content:  Reports none;  Denies none  Perception:  Reports none;  Denies none  Insight: good  Judgment: fair  Cognition: (6) does  appear grossly intact; formal cognitive testing was not done  Gait and Station: unable to assess due to video visit      Plan:   Augusto is to continue to practice harm reduction strategies. Writer encouraged Augusto to identify additional activities he would like to return to/try out. Next therapy appointment has been scheduled for 2/4/22  to continue work on treatment goals.      Treatment Plan review due: 2/07/2022      KRISHNA Vicente (Patty), [facility]    Direct Phone: [phone]  Fax: [phone]    [facility]  [facility], 2nd floor  [address], [address]  [place] [address]

## 2022-02-04 ENCOUNTER — VIRTUAL VISIT (OUTPATIENT)
Dept: PSYCHIATRY | Facility: CLINIC | Age: 42
End: 2022-02-04
Attending: SOCIAL WORKER
Payer: COMMERCIAL

## 2022-02-04 DIAGNOSIS — F41.1 GENERALIZED ANXIETY DISORDER: Primary | ICD-10-CM

## 2022-02-04 DIAGNOSIS — F33.1 MAJOR DEPRESSIVE DISORDER, RECURRENT EPISODE, MODERATE (H): ICD-10-CM

## 2022-02-04 PROCEDURE — 90837 PSYTX W PT 60 MINUTES: CPT | Mod: GT | Performed by: SOCIAL WORKER

## 2022-02-14 NOTE — PROGRESS NOTES
OUTPATIENT PSYCHOTHERAPY PROGRESS NOTE    Client Name: Augusto Lea   YOB: 1980 (41 year old)   Date of Service:  Feb 4, 2022  Time of Service: 4:01 pm to 5:00 pm (59 minutes)  Service Type(s):94365 psychotherapy (53-60 min. with patient and/or family)    Type of service: Telemedicine Psychotherapy  Reason for Telemedicine Visit: COVID-19 public health recommendations on in-person sessions  Mode of transmission: Secure real time interactive audio and visual telecommunication system (videoconference via [employer])  Location of originating and distant sites:    Originating site (patient location): patient home    Distant site (provider site): HIPAA compliant location within provider home/remote setting  Telemedicine Visit: The patient's condition can be safely assessed and treated via synchronous audio and visual telemedicine encounter.    Patient has agreed to receiving services via telemedicine technology.    Diagnoses:   F41.1   Generalized Anxiety Disorder  F33.1   Major Depressive Disorder, Recurrent, Moderate    Individuals Present:   Client attended alone    Treatment goal(s) being addressed:   -Decrease anxiety symptoms, including through use of mindfulness strategies  -Improve sleep  -Increase motivation in completing tasks  -Decrease alcohol intake    Subjective:  Augusto reports some recent positive changes, including decreased alcohol use and increased activity. He is reporting decreased symptoms of depression and anxiety. He reports on-going struggles with motivation.    Treatment:   -Provided validation and reflective listening as Augusto shared activities of the past week. He has continued to work on changing evening routine-with positive results. He has been playing zanda as a way to increase his walking time and notes he is averaging 20 or more miles per week. Although he was initially anxious about what others thought of him walking around/playing game, he was able to remind himself  that others were likely not paying attention to him. He has also continued to explore his neighborhood. He is watering down beer and reports not being hungover on the mornings after he consumes alcohol.   -continued to discuss strategies to manage urges. Writer discussed skill of being curious when he gets an urge to drink alcohol. Suggested asking self why the urge? What are the reasons to not give in to the urge? Augusto feels much of his drinking is around routine as the most difficult time for him to not drink is when he is playing video games online with friends. He has found that changing his routine (see above) to decrease video game time has been most helpful.  - Psychoeducation on potential ADHD symptom. Augusto shared update on decreased depressive/anxious symptoms. However, he continues to struggle with low motivation. Writer provided psychoeducation on executive dysfunction symptoms related to ADHD and wondered if this could be a potential symptom. Writer encouraged Augusto to follow up on psych testing.  -Problem solved options for finding positive activity to plan/look forward to. Augusto also reports not experiencing the periods of rizwan he has felt in the past and would like to get these back. Writer suggested finding an activity that he could work on doing some planning and experience potential happy anticipation. Writer suggested thinking about planning a visit to hometown to see friends as he reported happy experienced with those individuals. Augusto shared feeling anxious thinking about this option and how many people he would need to try to make happy/visit with this option. Writer also visibly observed Augusto tense up during conversation. Will plan to discuss expectations and how these impact him at next visit.  -discussion of potential discharge. Augusto is near end of time with [facility] program. Writer discussed recommendations. Augusto would benefit from on-going therapeutic support, although he may not require weekly  sessions. Writer able to provide recommendations for community providers, writer also able to provide on-going support. Another suggestion may be to wait until ADHD testing and recommendations and potentially seek ADHD focused psychotherapy.    Assessment and Progress:   Augutso is making significant changes, including with activity and decreased alcohol use. These changes, along with medication, appear to be having a positive impact on overall mental health and a decrease in symptoms. Augusto was engaged in session.    Mental Status Exam    Alertness: alert  and oriented  Appearance: well groomed  Behavior/Demeanor: cooperative and pleasant, with good  eye contact   Speech: normal and regular rate and rhythm  Language: no problems  Psychomotor: normal or unremarkable  Mood: anxious  Affect: full range; was congruent to mood; was congruent to content  Thought Process/Associations: unremarkable  Thought Content:  Reports none;  Denies none  Perception:  Reports none;  Denies none  Insight: good  Judgment: good  Cognition: (6) does  appear grossly intact; formal cognitive testing was not done  Gait and Station: unable to assess due to video visit      Plan:   Call [facility] to scheduled ADHD psychological evaluation, continue current activities. Next therapy appointment has been scheduled for 2/22/2022 to continue work on treatment goals. Session scheduled 2.5 weeks out as Augusto reports decreased symptoms, is nearing end of time in [facility], and to see how weeks go without weekly therapy session.      Treatment Plan review due: 2/07/2022      KRISHNA Vicente (Patty), [facility]    Direct Phone: [phone]  Fax: [phone]    [facility]  [facility], 2nd floor  [address], [address]  [place] [address]

## 2022-03-02 ENCOUNTER — VIRTUAL VISIT (OUTPATIENT)
Dept: PSYCHIATRY | Facility: CLINIC | Age: 42
End: 2022-03-02
Attending: SOCIAL WORKER
Payer: COMMERCIAL

## 2022-03-02 DIAGNOSIS — F41.1 GENERALIZED ANXIETY DISORDER: Primary | ICD-10-CM

## 2022-03-02 DIAGNOSIS — F33.1 MAJOR DEPRESSIVE DISORDER, RECURRENT EPISODE, MODERATE (H): ICD-10-CM

## 2022-03-02 PROCEDURE — 90832 PSYTX W PT 30 MINUTES: CPT | Mod: GT | Performed by: SOCIAL WORKER

## 2022-03-02 NOTE — Clinical Note
Ayo Eller,    Bry TAPIA. My note for last session with Augusto. You see him 1 more time end of March. My hope is he keeps doing well.    Jennifer

## 2022-03-11 NOTE — PROGRESS NOTES
OUTPATIENT PSYCHOTHERAPY PROGRESS NOTE    Client Name: Augusto Lea   YOB: 1980 (42 year old)   Date of Service:  Mar 2, 2022  Time of Service: 4:01 pm to 4:34 pm (33 minutes)  Service Type(s):27288 psychotherapy (23-37 min. with patient and/or family)    Type of service: Telemedicine Psychotherapy  Reason for Telemedicine Visit: COVID-19 public health recommendations on in-person sessions  Mode of transmission: Secure real time interactive audio and visual telecommunication system (videoconference via [employer])  Location of originating and distant sites:    Originating site (patient location): patient home    Distant site (provider site): HIPAA compliant location within provider home/remote setting  Telemedicine Visit: The patient's condition can be safely assessed and treated via synchronous audio and visual telemedicine encounter.    Patient has agreed to receiving services via telemedicine technology.    Diagnoses:   F41.1   Generalized Anxiety Disorder  F33.1   Major Depressive Disorder, Recurrent, Moderate    Individuals Present:   Client attended alone    Treatment goal(s) being addressed:   -Decrease anxiety symptoms, including through use of mindfulness strategies  -Improve sleep  -Increase motivation in completing tasks  -Decrease alcohol intake    Subjective:  Last therapy session. Augusto reports overall feeling content. He is continuing to make lifestyle changes and is taking medication. He has spent increased time with family.    Treatment:   -Provided reflective listening and validation as Augusto shared update from last several weeks. No therapy sessions for approximately 1 month while preparing to discharge from [facility] Treatment services. Augusto's alcohol intake has remained same/similar. He is continuing to exercise regularly. He got together with some friends for his birthday. He can spend overnight with his family and enjoys time with niece, but reports feeling a need to get home/becomes  antsy after several hours away from home. Augusto appears to be engaging in a form of his own exposure therapy as he is trying to spend more and more time with others and out of his home.  -Reviewed treatment goals. Augusto feels depressive and anxiety symptoms have decreased. He reports he can't remember the last time he had a bad day (as opposed to beginning of time with [facility] in which he reported occasional good days), sleep has improved significantly. When he wakes up at night, he is able to turn off his mind and return to sleep. Alcohol use has also decreased significantly. He rarely experiences negative effects of over drinking. He continues to struggle with completing tasks around the home. Writer noted his increased exercise activities. He has goals of completing home improvement projects. Reflected on all of the work he has done over the past several months.   -Discussed recommendations and next steps for future. Augusto has not reached out for further ADHD testing, noting he has forgotten about it at times due to feeling better. Writer encouraged him to work on obtaining an assessment while doing well managing depression/anxiety as it may be a good time to differentiate between potential ADHD symptoms and mood disorder. Wrier encouraged  Augusto to continue exercise routine and connecting with others as these appear to have a positive impact on both mood and alcohol intake. Writer brought up substance use group within clinic that does not require abstinence. Encouraged Augusto to reach out if he would find the group or therapy useful in future. Worked with Augusto on identifying potential signs that he should return to therapeutic activity. Augusto has an awareness of mental health symptoms that would indicate a need for further treatment.    Assessment and Progress:   Augusto made significant progress in managing depression and anxiety during time in [facility]. He is noting fewer bad days, increased  sleep, increased activity, improved mood, and decreased alcohol use. He has a strong awareness of potential thought distortions and is able to verbalize an intellectual awareness that they are not accurate. He would sometimes engage in avoidance activity of exploring core thoughts related to distortions and this may be an area of on-going therapeutic support if needed in the future. Augusto has a strong understanding of activities that benefit his mental health, including exercise and connection with others and writer would encourage on-going connections. If potential agoraphobia symptoms increase in future, he may benefit from exposure therapy. Augusto has had questions about potential ADHD symptoms. Based on experiences he has shared during therapy, there is the potential for ADHD diagnosis or ADHD like symptoms, including hyperfocus on specific activities, difficulties with executive functioning and starting/completing tasks. However, it has been difficult to establish if symptoms were present during childhood. Augusto has been encouraged to seek additional psychological testing regarding ADHD.    Mental Status Exam    Alertness: alert  and oriented  Appearance: well groomed  Behavior/Demeanor: cooperative and pleasant, with good  eye contact   Speech: normal and regular rate and rhythm  Language: intact  Psychomotor: normal or unremarkable  Mood: no noted concerns  Affect: full range; was congruent to mood; was congruent to content  Thought Process/Associations: unremarkable  Thought Content:  Reports none;  Denies none  Perception:  Reports none;  Denies none  Insight: good  Judgment: good  Cognition: (6) does  appear grossly intact; formal cognitive testing was not done  Gait and Station: unable to assess due to video visit    Plan:   Last therapy session with writer. Augusto has one additional psychiatry appointment scheduled with Dr. Grady on 3/31/22. Writer would encourage Augusto to reach out if he is in need of  additional support in the future. Writer has enjoyed supporting Augusto in working towards his mental health goals and wishes him well in the future..      Treatment Plan review due: last session, reviewed goals      Rose Marie Terrazas) KRISHNA Enriquez, [facility]    Direct Phone: [phone]  Fax: [phone]    [facility]  [facility], 2nd floor  [address], [address]  [place] [address]

## 2022-03-31 ENCOUNTER — VIRTUAL VISIT (OUTPATIENT)
Dept: PSYCHIATRY | Facility: CLINIC | Age: 42
End: 2022-03-31
Attending: PSYCHIATRY & NEUROLOGY
Payer: COMMERCIAL

## 2022-03-31 DIAGNOSIS — F41.1 GENERALIZED ANXIETY DISORDER: ICD-10-CM

## 2022-03-31 DIAGNOSIS — F51.05 INSOMNIA DUE TO OTHER MENTAL DISORDER: ICD-10-CM

## 2022-03-31 DIAGNOSIS — F99 INSOMNIA DUE TO OTHER MENTAL DISORDER: ICD-10-CM

## 2022-03-31 DIAGNOSIS — F10.20 UNCOMPLICATED ALCOHOL DEPENDENCE (H): ICD-10-CM

## 2022-03-31 DIAGNOSIS — F33.0 MILD EPISODE OF RECURRENT MAJOR DEPRESSIVE DISORDER (H): Primary | ICD-10-CM

## 2022-03-31 PROCEDURE — 99214 OFFICE O/P EST MOD 30 MIN: CPT | Mod: GT | Performed by: PSYCHIATRY & NEUROLOGY

## 2022-03-31 RX ORDER — ALPRAZOLAM 0.5 MG
0.5 TABLET ORAL
Qty: 10 TABLET | Refills: 0 | Status: SHIPPED | OUTPATIENT
Start: 2022-03-31

## 2022-03-31 RX ORDER — NALTREXONE HYDROCHLORIDE 50 MG/1
50 TABLET, FILM COATED ORAL DAILY
Start: 2022-03-31

## 2022-03-31 RX ORDER — GABAPENTIN 300 MG/1
600-1200 CAPSULE ORAL
Qty: 120 CAPSULE | Refills: 0 | Status: SHIPPED | OUTPATIENT
Start: 2022-03-31

## 2022-03-31 RX ORDER — SERTRALINE HYDROCHLORIDE 100 MG/1
150 TABLET, FILM COATED ORAL DAILY
Qty: 45 TABLET | Refills: 1 | Status: SHIPPED | OUTPATIENT
Start: 2022-03-31

## 2022-03-31 NOTE — PATIENT INSTRUCTIONS
**For crisis resources, please see the information at the end of this document**   Patient Education    Thank you for coming to the [facility] [place] [facility].    Lab Testing:  If you had lab testing today and your results are reassuring or normal they will be mailed to you or sent through [employer] within 7 days. If the lab tests need quick action we will call you with the results. The phone number we will call with results is # [phone]. If this is not the best number please call our clinic and change the number.     Medication Refills:  If you need any refills please call your pharmacy and they will contact us. Our fax number for refills is [phone]. Please allow three business days for refill processing.   If you need to change to a different pharmacy, please contact the new pharmacy directly. The new pharmacy will help you get your medications transferred.     Contact Us:  Please call [phone] during business hours (8-5:00 M-F).  If you have medication related questions after clinic hours, or on the weekend, please call [phone].    Financial Assistance [phone]  Medical Records [phone]       MENTAL HEALTH CRISIS RESOURCES:  For a emergency help, please call 911 or go to the nearest Emergency Department.     Emergency Walk-In Options:   EmPATH Unit @ [facility] ([place]): [phone] - Specialized mental health emergency area designed to be calming  [facility] ([place]): [phone]  [facility] ([place]): [phone]  [facility]): [phone]    County Crisis Information:   [place]: [phone]  [place]: [phone]  [place] - Adult: [phone]     [place]: [phone]  [place] - Adult: [phone]     Child: [phone]  [place]: [phone]  List of all [facility] resources:    https://[place].gov/dhs/people-we-serve/adults/health-care/mental-health/resources/crisis-contacts.jsp    National Crisis Information:   Crisis Text Line: Text  [place]  to 688367  National Suicide Prevention Lifeline: 3-193-449-TALK (1-686.114.6646)       For online chat options, visit https://suicidepreventionlifeline.org/chat/  Poison Control Center: 4-804-760-3636  Trans Lifeline: 7-563-980-6609 - Hotline for transgender people of all ages  [employer]: 7-325-648-8249 - Hotline for LGBT youth     For Non-Emergency Support:   Fast Tracker: Mental Health & Substance Use Disorder Resources -   https://www.[employer].org/

## 2022-03-31 NOTE — PROGRESS NOTES
Augusto Lea is a 42 year old who has consented to receive services via billable video visit.      Pt will join video visit via: [email or web address]  If there are problems joining the visit, send backup video invite via: Text to phone: [phone]       Originating Location (patient location): Patient's home  Distant Location (provider location): [facility] MENTAL HEALTH & ADDI[facility]    Will anyone else be joining the video visit? No    How would you prefer to obtain AVS?: MyChart     Telehealth Details  Type of service:  video visit for consult  Date of service: Mar 31, 2022  Time of service:    Start Time:  2:15 PM    End Time:  2:30 PM    Reason for video visit:  Services only offered telehealth  Originating Site (patient location):  Patient's home  Distant Site (provider location):  [facility] PSYCHIATRY  Mode of Communication:  Video conference via [email or web address]       [facility]  Rapid Access Brief Treatment Program  MEDICAL PROGRESS NOTE #5     Augusto Lea is a 42 year old with history of anxiety.     CARE TEAM: PCP- Sonia Sesay with 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 Depression is a newer issue, having started more gradually over the last few years, and is closely associated with the anxiety. Also notes a pattern of finding alcohol more helpful than he would prefer in helping to reduce anxiety and insomnia. Actively tries to limit use, but does tend to rely on it more when things get more severe.   We did discuss his  history with attentional symptoms. I do think it would be worth having him explore ADHD evaluation. We did discuss that even with this, stimulant prescribing can be complicated, but the assessment may be helpful with targeting symptoms and figuring out the interplay between attentional symptoms and anxiety.   - Pros: Can be helpful for discerning specific strengths / weaknesses, and specific cognitive areas to focus interventions. Also may help parsing mood symptoms from primary attentional symptoms, and may even result in a diagnosis of ADHD, depending on many factors.   - Cons: May not result in a diagnosis, and regardless, this does not address the other factors that complicate stimulant prescribing.   Many times the recommendation is to address mood symptoms to be able to better assess attentional needs.   Psychotherapy discussion: Primary recommendation for the treatment of mood symptoms, especially anxiety. Supportive therapy can be useful for reducing the impact of acute or chronic psychosocial stressors. CBT can be particularly helpful for ruminative thinking and addressing maladaptive coping mechanisms that may worsen anxiety.   Medication discussion:   Primary mood support medications:   Augusto has noted improvement in symptoms, seems likely that sertraline was part of this. He did note possibly having some slightly improved motivation and less brain fog. Given that he has tolerated it well, and that things are at least headed in the right direction, I recommended maintaining on the current dose at this time. There is room to increase up to 200mg if needed.   If an alternative medication is needed, could try venlafaxine ER. Also could try newer generation antidepressants like Pristiq, Trintellix, or Viibryd.   Short acting anxiolytic medications:   Augusto has a history of depending on short acting anxiolytic substances (alcohol and alprazolam) to reduce anxiety and insomnia symptoms. We discussed in detail the  complex interaction between anxiety and substances, and how short acting substances do perpetuate anxiety and usually worsen it over time, prevent it from improving.   He has worked hard over the course of treatment to reduce his reliance on short acting anxiolytics and has significantly reduced his routine use of these medications. Although the most ideal situation would be to not use any alprazolam at all, if he could get down to the point of using it only monthly or less, that would be a reasonable goal.     We did also talk about alternatives that may be helpful and I did provide a ranged dosing of gabapentin for Augusto to try to see how this might help with nighttime symptoms. Fortunately, he has found this to be beneficial for sleep, and avoids using it concurrently with alcohol.   Given the issues with falling asleep and getting up in the morning, there may be some delayed phase circadian rhythm shift. Melatonin is often used for this, but unfortunately caused Augusto to feel more restless and caused strange dreams. Light box therapy can also be helpful for circadian shifts.   Antiadrenergic medications:   Augusto has evidence of autonomic hyperarousal (elevated fight-or-flight), with high baseline anxiety including physical symptoms and occasional panic attacks. Clonidine was not helpful for symptoms and actually seemed to interfere with sleep. Guanfacine may be another option to consider, especially given his concerns about ADHD.   Alcohol dependence:   Augusto does note that he relies on alcohol more often than he would like to, and did like the idea of naltrexone and how it can reduce the drive to drink and diminish the strength of the euphoria generated by drinking. Unfortunately, he noted issues with fatigue and wooziness while taking it. I encouraged him to try it for 2 weeks, or try it at half dose to see if this helps, but that otherwise it would be best to consider alternatives like acamprosate or topiramate.    We did discuss that the Vivitrol IM form of naltrexone may also be worth considering if the PO form is partially but not completely effective.     [place] PRESCRIPTION MONITORING PROGRAM [] was checked today: indicates taking controlled substances as prescribed    PSYCHOTROPIC DRUG INTERACTIONS: None   MANAGEMENT:  N/A     Plan     1) PSYCHOTROPIC MEDICATION RECOMMENDATIONS:  - Continue sertraline 150mg daily  - May take gabapentin 300-1200mg at bedtime as needed for anxiety / sleep  - May take alprazolam 0.5mg at bedtime as needed for anxiety   - Try to limit use to less than weekly    - May try retrial of naltrexone 25-50mg daily   - Also may consider acamprosate or topiramate as alteratives.     [see the following SmartPhrase(s) for more information: PSYMEDINFOSERTRALINE - PSYMEDINFOBENZOS]    2) THERAPY: Psychotherapy is a primary recommendation.   Currently engaged in individual therapy with Jennifer Enriquez through [facility] program.    Will be seeking long term therapy support.     3) NEXT DUE:   Labs- Routine monitoring is not indicated for current psychotropic medication regimen   ECG- Routine monitoring is not indicated for current psychotropic medication regimen   Rating Scales- N/A    4) REFERRALS / COORDINATION:   12/2021 - Placed referral for ADHD evaluation. He is still intending to get this done.     5) DISPOSITION: Follow up with PCP. Pt or PCP may contact me for additional chart review / recommendations going forward.     Treatment Risk Statement:  The patient understands the risks, benefits, adverse effects and alternatives. Agrees to treatment with the capacity to do so. No medical contraindications to treatment. Agrees to contact provider for any problems. The patient understands to call 911 or go to the nearest ED if urgent or life threatening symptoms occur. Crisis contact numbers are provided routinely in the After Visit Summary.       PROVIDER:  Dionisio Grady MD    Level of Medical  "Decision Making:   - At least 1 chronic problem that is not stable  - Engaged in prescription drug management during visit (discussed any medication benefits, side effects, alternatives, etc.)        Pertinent Background   Per recent DA on 10/4/21:   Depression-  Reports symptoms for the past 2 years of feeling down, depressed, and hopeless, as well as lack of motivation, anhedonia (described it as a \"constant state of boredom\"), insomnia, fatigue, difficulty concentrating/making decisions, fidgety/restlessness, and intermittent decreased appetite. He used to have many hobbies including hunting, carpentry, golfing, and playing video games, but \"none of it brings me pleasure anymore.\" He states that \"I don't remember what happy is, content is the best I can get to.\" He reports being a  2+ years ago, which was the last memory he has of being \"happy.\"  Anxiety- Patient reports experiencing \"test anxiety\" as a child, but his anxiety notably worsened about 3-4 years ago. He states he does not \"do the things I used to do, sometimes I can't do anything.\" He notes he takes approximately 1-2 mental health days per month, and received reasonable accommodation to work from home due to his anxiety. He also endorses insomnia, racing thoughts, fidgeting/pacing/restlessness, and difficulty concentrating.  OCD- Patient reports intrusive thoughts that began about 10-12 years ago of death, dying, and \"impending doom\" such as someone breaking into his house. He reports he intermittently tries to suppress these thoughts, particularly at bedtime. He reports compulsive behaviors such as double checking locks about 2x, counting stairs, and being obsessed with even numbers. He reports he does not feel like the compulsions take up too much of his time, but the intrusive thoughts interfere with his social life and ability to concentrate. He states he has been prescribed medication to manage his intrusive thoughts, but has had no " relief.     Interval History    Augusto notes that things are going okay at the moment.   Overall anxiety is still much better than it was. He can't remember the last time he needed alprazolam, needs it a lot less now.   Sleep is better as well.   Depression is better too, although still there.   Still trying to cut back on drinking, but doesn't feel that it is affecting work or relationships, just wants to cut it out for health reasons. Has not used naltrexone because every time he tries it it makes him feel tired and a little woozy.   Does use gabapentin occasionally, either 900 or 1200mg which does help.        Discussion points from past visits:   Augusto has been talking with his family more about mental health now that he is getting treatment, and finding out more about his father having lifelong anxiety. He also spoke with his mom about ADHD. Notes that he was never physically hyper in childhood, but does think that he had a lot of trouble with attention, mind racing, things like that, going back to childhood. He did have issues in school, but never told his mom about this. Doesn't think they would have taken him in anyway as this wasn't the kind of thing that they did in the family.   Did reduce the alcohol use the first week after we met, but has had difficulty maintaining. Is motivated to cut back but has cravings. Does drink to help mitigate symptoms. Tries not to drink every day. Has at one point gone through 1.75L vodka in 1 week. He is ambivalent about substance use. Does note it may not be ideal, but is not sure about whether he is ready to make changes for this. Alcohol does help with anxiety and relaxation. Helps him to enjoy things more. Prefers video games to TV, enjoys being more engaged, enjoys the social element, and alcohol does help to enjoy things more.   Overall Augusto has not really been able to figure out the trigger for things getting worse over time. He used to enjoy golfing and doing other  things, but just doesn't feel that most of the time now. Has a few good days, but they are rare.   Has episodes where anxiety peaks ~few times per week, where he has to pace and calm himself down. The severe panic attacks happen a few times a year, where he has severe symptoms and it feels like he might die. Things have been worse since COVID. Gets significant issues with irritability. Has punched the wall at times, did hurt / possibly break his hand at one point.   Sleep is very poor. Stays up until 12:30-1 every night because he can't sleep unless he is totally exhausted. Then it is hard to get up in the mornings as well.   Social anxiety is a newer issue that did not seem to be as present before. [place] the [place] [employer] [place] years [place] for work. 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     Pertinent Social Hx:  FINANCIAL SUPPORT- Working as a  for the [employer] for about 10 years. He currently works from home after receiving reasonable accommodation due to his anxiety.   LIVING SITUATION / RELATIONSHIPS- Currently living alone, moved into his house about a year ago. He reports moving here from [place], [place] about 10 years ago for work, it has been a difficult transition.   SOCIAL/ SPIRITUAL SUPPORT- Has family in [place]. Hasn't made a lot of connections in [place].     Pertinent Substance Use  Alcohol- At peak, went through about 1.75L bottle of Lisman in about a week. Has reduced use since that time. Sets rules for himself around use. It does help quite a bit with anxiety which he feels is unfortunate, and does help him to sleep through the night as well.   Nicotine- Used to chew for 10 years, gave up in 2017, but it is hard, helped with anxiety and still craves at times.   Caffeine- 1 cup of coffee some days, mostly none though.   Opioids- None  Narcan Kit- N/A  THC/CBD- Has tried edibles to see about  helping with anxiety, but didn't really do much.   Other Illicit Drugs- none     Medical Review of Systems    Dizziness/orthostasis- None  Headaches- None  GI- Chronic low left abdomen pain for the past 3-4 years, which worsens anxiety and depression symptoms. He states he has been seeing doctors for this chronic pain and they have ruled out cancer.      Past Psychiatric History - Summary points of current care   10/2021 - Started sertraline. Switched from propranolol to clonidine.   11/2021 - Increased sertraline. Started gabapentin.   12/2021 - Increased to sertraline 150mg. Started naltrexone.      Psychotropic Medication Trials      Medication Max Dose (mg) Dates / Duration Helpful? DC Reason / Adverse Effects?   Fluoxetine? 20? 2011     Lexapro  ~2008-9, 2017  Possible lower energy   sertraline 150 10/2021 yes    Paxil    Severe nausea and severe anxiety   alprazolam 0.5  yes    Propranolol  20  no    clonidine 0.1 10/2021 ? Initial odd dreams / restlessness, BP improved   melatonin   no Was groggy, but restless sleep   naltrexone 50 12/2021  Feels tired and woozy on it      Past Medical History      Neurologic Hx [head injury, seizures, etc.]: None  No past medical history on file.     Medications      Current Outpatient Medications   Medication Sig Dispense Refill     ALPRAZolam (XANAX) 0.5 MG tablet Take 1 tablet (0.5 mg) by mouth nightly as needed for anxiety 15 tablet 0     gabapentin (NEURONTIN) 300 MG capsule Take 1-4 capsules (300-1,200 mg) by mouth nightly as needed (for anxiety / sleep) 120 capsule 1     hyoscyamine (LEVSIN/SL) 0.125 MG sublingual tablet Place 0.125 mg under the tongue 3 times daily as needed       naltrexone (DEPADE/REVIA) 50 MG tablet Take 1 tablet (50 mg) by mouth daily 30 tablet 1     sertraline (ZOLOFT) 100 MG tablet Take 1.5 tablets (150 mg) by mouth daily 45 tablet 1      Physical Exam  (Vitals Only)   There were no vitals taken for this visit.      Mental Status Exam    Alertness: alert  and oriented  Appearance: adequately groomed  Behavior/Demeanor: cooperative, pleasant and calm, with good eye contact   Speech: normal and regular rate and rhythm  Language: intact and no problems  Psychomotor: normal or unremarkable  Mood: Does note improvement in anxiety and depression, although some low mood still present  Affect: full range and appropriate; was congruent to mood  Thought Process/Associations: unremarkable  Thought Content:  Reports passive SI without intent or plan;  Denies violent ideation and delusions  Perception:  Reports none;  Denies auditory hallucinations and visual hallucinations  Insight: intact  Judgment: intact  Cognition: does  appear grossly intact; formal cognitive testing was not done  Gait and Station: not observed     Data      PHQ-9 SCORE 10/4/2021 12/13/2021   PHQ-9 Total Score MyChart 19 (Moderately severe depression) 17 (Moderately severe depression)   PHQ-9 Total Score 19 17     JE-7 SCORE 10/4/2021 12/13/2021   Total Score 19 (severe anxiety) 13 (moderate anxiety)   Total Score 19 13

## 2022-09-18 ENCOUNTER — HEALTH MAINTENANCE LETTER (OUTPATIENT)
Age: 42
End: 2022-09-18

## 2023-01-28 ENCOUNTER — HEALTH MAINTENANCE LETTER (OUTPATIENT)
Age: 43
End: 2023-01-28

## 2024-02-25 ENCOUNTER — HEALTH MAINTENANCE LETTER (OUTPATIENT)
Age: 44
End: 2024-02-25

## 2025-03-09 ENCOUNTER — HEALTH MAINTENANCE LETTER (OUTPATIENT)
Age: 45
End: 2025-03-09